# Patient Record
Sex: FEMALE | Race: WHITE | NOT HISPANIC OR LATINO | Employment: OTHER | URBAN - METROPOLITAN AREA
[De-identification: names, ages, dates, MRNs, and addresses within clinical notes are randomized per-mention and may not be internally consistent; named-entity substitution may affect disease eponyms.]

---

## 2019-03-18 ENCOUNTER — HOSPITAL ENCOUNTER (OUTPATIENT)
Dept: GENERAL RADIOLOGY | Facility: HOSPITAL | Age: 74
Discharge: HOME | End: 2019-03-18
Admitting: ORTHOPAEDIC SURGERY

## 2019-03-18 ENCOUNTER — APPOINTMENT (OUTPATIENT)
Dept: PREADMISSION TESTING | Facility: HOSPITAL | Age: 74
End: 2019-03-18

## 2019-03-18 VITALS — HEIGHT: 63 IN | BODY MASS INDEX: 33.83 KG/M2 | WEIGHT: 190.92 LBS

## 2019-03-18 LAB
ALBUMIN SERPL-MCNC: 4.53 G/DL (ref 3.2–4.8)
ALBUMIN/GLOB SERPL: 1.7 G/DL (ref 1.5–2.5)
ALP SERPL-CCNC: 68 U/L (ref 25–100)
ALT SERPL W P-5'-P-CCNC: 21 U/L (ref 7–40)
ANION GAP SERPL CALCULATED.3IONS-SCNC: 9 MMOL/L (ref 3–11)
APTT PPP: 28.6 SECONDS (ref 24–37)
AST SERPL-CCNC: 22 U/L (ref 0–33)
BACTERIA UR QL AUTO: NORMAL /HPF
BASOPHILS # BLD AUTO: 0.04 10*3/MM3 (ref 0–0.2)
BASOPHILS NFR BLD AUTO: 0.4 % (ref 0–1)
BILIRUB SERPL-MCNC: 0.3 MG/DL (ref 0.3–1.2)
BILIRUB UR QL STRIP: NEGATIVE
BUN BLD-MCNC: 32 MG/DL (ref 9–23)
BUN/CREAT SERPL: 38.1 (ref 7–25)
CALCIUM SPEC-SCNC: 9.9 MG/DL (ref 8.7–10.4)
CHLORIDE SERPL-SCNC: 104 MMOL/L (ref 99–109)
CLARITY UR: CLEAR
CO2 SERPL-SCNC: 28 MMOL/L (ref 20–31)
COLOR UR: YELLOW
CREAT BLD-MCNC: 0.84 MG/DL (ref 0.6–1.3)
DEPRECATED RDW RBC AUTO: 47.5 FL (ref 37–54)
EOSINOPHIL # BLD AUTO: 0.17 10*3/MM3 (ref 0–0.3)
EOSINOPHIL NFR BLD AUTO: 1.7 % (ref 0–3)
ERYTHROCYTE [DISTWIDTH] IN BLOOD BY AUTOMATED COUNT: 14.1 % (ref 11.3–14.5)
GFR SERPL CREATININE-BSD FRML MDRD: 66 ML/MIN/1.73
GLOBULIN UR ELPH-MCNC: 2.7 GM/DL
GLUCOSE BLD-MCNC: 110 MG/DL (ref 70–100)
GLUCOSE UR STRIP-MCNC: NEGATIVE MG/DL
HBA1C MFR BLD: 5.9 % (ref 4.8–5.6)
HCT VFR BLD AUTO: 39.8 % (ref 34.5–44)
HGB BLD-MCNC: 13.1 G/DL (ref 11.5–15.5)
HGB UR QL STRIP.AUTO: NEGATIVE
HYALINE CASTS UR QL AUTO: NORMAL /LPF
IMM GRANULOCYTES # BLD AUTO: 0.02 10*3/MM3 (ref 0–0.05)
IMM GRANULOCYTES NFR BLD AUTO: 0.2 % (ref 0–0.6)
INR PPP: 1 (ref 0.85–1.16)
KETONES UR QL STRIP: NEGATIVE
LEUKOCYTE ESTERASE UR QL STRIP.AUTO: NEGATIVE
LYMPHOCYTES # BLD AUTO: 3.42 10*3/MM3 (ref 0.6–4.8)
LYMPHOCYTES NFR BLD AUTO: 33.9 % (ref 24–44)
MCH RBC QN AUTO: 30.6 PG (ref 27–31)
MCHC RBC AUTO-ENTMCNC: 32.9 G/DL (ref 32–36)
MCV RBC AUTO: 93 FL (ref 80–99)
MONOCYTES # BLD AUTO: 0.75 10*3/MM3 (ref 0–1)
MONOCYTES NFR BLD AUTO: 7.4 % (ref 0–12)
NEUTROPHILS # BLD AUTO: 5.71 10*3/MM3 (ref 1.5–8.3)
NEUTROPHILS NFR BLD AUTO: 56.6 % (ref 41–71)
NITRITE UR QL STRIP: NEGATIVE
PH UR STRIP.AUTO: <=5 [PH] (ref 5–8)
PLATELET # BLD AUTO: 350 10*3/MM3 (ref 150–450)
PMV BLD AUTO: 10.9 FL (ref 6–12)
POTASSIUM BLD-SCNC: 3.9 MMOL/L (ref 3.5–5.5)
PROT SERPL-MCNC: 7.2 G/DL (ref 5.7–8.2)
PROT UR QL STRIP: NEGATIVE
PROTHROMBIN TIME: 12.7 SECONDS (ref 11.2–14.3)
RBC # BLD AUTO: 4.28 10*6/MM3 (ref 3.89–5.14)
RBC # UR: NORMAL /HPF
REF LAB TEST METHOD: NORMAL
SODIUM BLD-SCNC: 141 MMOL/L (ref 132–146)
SP GR UR STRIP: 1.02 (ref 1–1.03)
SQUAMOUS #/AREA URNS HPF: NORMAL /HPF
UROBILINOGEN UR QL STRIP: NORMAL
WBC NRBC COR # BLD: 10.09 10*3/MM3 (ref 3.5–10.8)
WBC UR QL AUTO: NORMAL /HPF

## 2019-03-18 PROCEDURE — 36415 COLL VENOUS BLD VENIPUNCTURE: CPT

## 2019-03-18 PROCEDURE — 85610 PROTHROMBIN TIME: CPT | Performed by: ORTHOPAEDIC SURGERY

## 2019-03-18 PROCEDURE — 71046 X-RAY EXAM CHEST 2 VIEWS: CPT

## 2019-03-18 PROCEDURE — 80053 COMPREHEN METABOLIC PANEL: CPT | Performed by: ORTHOPAEDIC SURGERY

## 2019-03-18 PROCEDURE — 93010 ELECTROCARDIOGRAM REPORT: CPT | Performed by: INTERNAL MEDICINE

## 2019-03-18 PROCEDURE — 93005 ELECTROCARDIOGRAM TRACING: CPT

## 2019-03-18 PROCEDURE — 85025 COMPLETE CBC W/AUTO DIFF WBC: CPT | Performed by: ORTHOPAEDIC SURGERY

## 2019-03-18 PROCEDURE — 83036 HEMOGLOBIN GLYCOSYLATED A1C: CPT | Performed by: ORTHOPAEDIC SURGERY

## 2019-03-18 PROCEDURE — 81001 URINALYSIS AUTO W/SCOPE: CPT | Performed by: ORTHOPAEDIC SURGERY

## 2019-03-18 PROCEDURE — 85730 THROMBOPLASTIN TIME PARTIAL: CPT | Performed by: ORTHOPAEDIC SURGERY

## 2019-03-18 RX ORDER — LOSARTAN POTASSIUM 50 MG/1
100 TABLET ORAL DAILY
COMMUNITY

## 2019-03-18 RX ORDER — ASPIRIN 81 MG/1
81 TABLET ORAL DAILY
COMMUNITY

## 2019-03-18 RX ORDER — SERTRALINE HYDROCHLORIDE 100 MG/1
50 TABLET, FILM COATED ORAL DAILY
COMMUNITY

## 2019-03-18 RX ORDER — ATORVASTATIN CALCIUM 10 MG/1
10 TABLET, FILM COATED ORAL NIGHTLY
COMMUNITY

## 2019-03-18 RX ORDER — CHLORTHALIDONE 25 MG/1
25 TABLET ORAL DAILY
COMMUNITY

## 2019-03-18 RX ORDER — CARVEDILOL 12.5 MG/1
12.5 TABLET ORAL 2 TIMES DAILY WITH MEALS
COMMUNITY

## 2019-03-18 RX ORDER — POTASSIUM CHLORIDE 600 MG/1
8 TABLET, FILM COATED, EXTENDED RELEASE ORAL DAILY
COMMUNITY

## 2019-03-18 RX ORDER — ATENOLOL 25 MG/1
25 TABLET ORAL DAILY
COMMUNITY

## 2019-03-18 RX ORDER — PANTOPRAZOLE SODIUM 40 MG/1
40 TABLET, DELAYED RELEASE ORAL DAILY
COMMUNITY

## 2019-03-18 RX ORDER — LEVOTHYROXINE SODIUM 0.05 MG/1
50 TABLET ORAL DAILY
COMMUNITY

## 2019-03-18 NOTE — PAT
CARDIAC CLEARANCE NOTE ON CHART FROM DR. GRAVES.    It was noted during Pre Admission Testing that patient was wearing some form of fingernail polish (gel/regular) and/or acrylic/artificial nails.  Patient was told that polish and/or artificial nails must be removed for surgery.  If a patient had recent manicure, and would rather not remove polish or artificial nails. Then the minimum requirement is that the polish/artificial nails must be removed from the middle finger on each hand.  Patient verbalized understanding.    If patient was having surgery on an upper extremity, then the patient was instructed that fingernail polish/artificial fingernails must be removed for surgery.  NO EXCEPTIONS.  Patient verbalized understanding.    If patient was having surgery on a lower extremity, then the patient was instructed that toenail polish on both extremities must be removed for surgery.  NO EXCEPTIONS. Patient verbalized understanding.      Per Anesthesia Request, patient instructed not to take their ACE/ARB medications on the AM of surgery.    Patient instructed to drink 20 ounces (or until full) of Gatorade or 20 ounces of G2 (if diabetic) and complete 1 hour before arrival time for procedure (NO RED Gatorade or G2)    Patient verbalized understanding.      Patient given a prescription for Benzol Peroxide 5% wash during PAT visit.  Instructed them to fill the prescription or  from [facility] pharmacy if was submitted electronically by the surgeon's office.  Verbal and written instructions given regarding proper use of the Benzoyl Peroxide wash given to patient and/or famlily during PAT visit. Patient/family also instructed to complete checklist and return it to Pre-op on the day of surgery.  Patient and/or family verbalized understanding.      Additionally, reinforced with patient to acquire this prescription from the [facility] retail pharmacy before leaving the hospital after PAT visit due to the potential unavailability at  local pharmacies.

## 2019-03-18 NOTE — DISCHARGE INSTRUCTIONS
The following information and instructions were given:    Do not eat, drink, smoke or chew gum after midnight the night before surgery. This also includes no mints.  Take all routine, prescribed medications including heart and blood pressure medicines with a sip of water unless otherwise instructed by your physician.   Do NOT take diabetic medication unless instructed by your physician.    If you were instructed to drink 20 ounces (or until full) of Gatorade or G2 (if diabetic) the morning of surgery, the Gatorade or G2 must be completed 1 hour before arrival time on the day of surgery .  No RED Gatorade or G2.      DO NOT shave for two days before your procedure.  Do not wear makeup.      DO NOT wear fingernail polish (gel/regular) and/or acrylic/artificial nails on the day of surgery.   If you have had a recent manicure and would rather not remove polish or artificial nails, then the minimum requirement is that the polish/artificial nails must be removed from the middle finger on each hand.      If you are having surgery/procedure on an upper extremity, then fingernail polish/artificial fingernails must be removed for surgery.  NO EXCEPTIONS.      If you are having surgery/procedure on a lower extremity, then toenail polish on both extremities must be removed for surgery.  NO EXCEPTIONS.    Remove all jewelry (advise to go to jeweler if unable to remove).  Jewelry, especially rings, can no longer be taped for surgery.    Leave anything you consider valuable at home.    Leave your suitcase in the car until after your surgery.    Bring the following with you the day of your procedure (when applicable)       -picture ID and insurance cards       -Co-pay/deductible required by insurance       -Medications in the original bottles (not a list) including all over-the-counter medications if not brought to PAT       -Copy of advance directive, living will or power of  documents if not brought to PAT       -CPAP or  BIPAP mask and tubing (do not bring machine)       -Skin prep instruction(s) sheet       -PAT Pass    Education booklet, brochure, handout or binder related to procedure given to patient.    When applicable, an ERAS booklet was given to patient.    Pain Control After Surgery handout given to patient.    Respirex use (handout given to patient) and pneumonia prevention education provided.    Signs and Symptoms of infection discussed.    DVT Prevention education given.  Stressing the importance of ambulation.    Please apply Chlorhexadine wipes to surgical area (if instructed) the night before procedure and the AM of procedure and document date/time of applications on skin prep instruction sheet.        Patient given a prescription for Benzol Peroxide 5% wash during PAT visit.  Instructed them to fill the prescription or  from [facility] pharmacy if was submitted electronically by the surgeon's office.  Verbal and written instructions given regarding proper use of the Benzoyl Peroxide wash given to patient and/or famlily during PAT visit. Patient/family also instructed to complete checklist and return it to Pre-op on the day of surgery.  Patient and/or family verbalized understanding.      Additionally, reinforced with patient to acquire this prescription from the [facility] retail pharmacy before leaving the hospital after PAT visit due to the potential unavailability at local pharmacies.    Patient instructed to drink 20 ounces (or until full) of Gatorade or 20 ounces of G2 (if diabetic) and complete 1 hour before arrival time for procedure (NO RED Gatorade or G2)    Patient verbalized understanding.

## 2019-03-28 ENCOUNTER — APPOINTMENT (OUTPATIENT)
Dept: GENERAL RADIOLOGY | Facility: HOSPITAL | Age: 74
End: 2019-03-28

## 2019-03-28 ENCOUNTER — HOSPITAL ENCOUNTER (INPATIENT)
Facility: HOSPITAL | Age: 74
LOS: 4 days | Discharge: NURSING, REHAB OR LONG-TERM CARE | End: 2019-04-01
Attending: ORTHOPAEDIC SURGERY | Admitting: ORTHOPAEDIC SURGERY

## 2019-03-28 ENCOUNTER — ANESTHESIA EVENT (OUTPATIENT)
Dept: PERIOP | Facility: HOSPITAL | Age: 74
End: 2019-03-28

## 2019-03-28 ENCOUNTER — ANESTHESIA (OUTPATIENT)
Dept: PERIOP | Facility: HOSPITAL | Age: 74
End: 2019-03-28

## 2019-03-28 DIAGNOSIS — Z74.09 IMPAIRED FUNCTIONAL MOBILITY, BALANCE, GAIT, AND ENDURANCE: Primary | ICD-10-CM

## 2019-03-28 DIAGNOSIS — Z74.09 IMPAIRED MOBILITY AND ADLS: ICD-10-CM

## 2019-03-28 DIAGNOSIS — Z78.9 IMPAIRED MOBILITY AND ADLS: ICD-10-CM

## 2019-03-28 PROBLEM — R73.03 PREDIABETES: Status: ACTIVE | Noted: 2019-03-28

## 2019-03-28 PROBLEM — E78.5 HLD (HYPERLIPIDEMIA): Status: ACTIVE | Noted: 2019-03-28

## 2019-03-28 PROBLEM — M19.011 GLENOHUMERAL ARTHRITIS, RIGHT: Status: ACTIVE | Noted: 2019-03-28

## 2019-03-28 PROBLEM — M25.511 RIGHT SHOULDER PAIN: Status: ACTIVE | Noted: 2019-03-28

## 2019-03-28 PROBLEM — I10 HTN (HYPERTENSION): Status: ACTIVE | Noted: 2019-03-28

## 2019-03-28 PROBLEM — E03.9 HYPOTHYROID: Status: ACTIVE | Noted: 2019-03-28

## 2019-03-28 LAB
GLUCOSE BLDC GLUCOMTR-MCNC: 126 MG/DL (ref 70–130)
GLUCOSE BLDC GLUCOMTR-MCNC: 219 MG/DL (ref 70–130)
POTASSIUM BLDA-SCNC: 3.94 MMOL/L (ref 3.5–5.3)

## 2019-03-28 PROCEDURE — 25010000002 DEXAMETHASONE PER 1 MG: Performed by: NURSE ANESTHETIST, CERTIFIED REGISTERED

## 2019-03-28 PROCEDURE — 84132 ASSAY OF SERUM POTASSIUM: CPT | Performed by: ANESTHESIOLOGY

## 2019-03-28 PROCEDURE — 25010000003 CEFAZOLIN IN DEXTROSE 2-4 GM/100ML-% SOLUTION: Performed by: ORTHOPAEDIC SURGERY

## 2019-03-28 PROCEDURE — 0RRJ00Z REPLACEMENT OF RIGHT SHOULDER JOINT WITH REVERSE BALL AND SOCKET SYNTHETIC SUBSTITUTE, OPEN APPROACH: ICD-10-PCS | Performed by: ORTHOPAEDIC SURGERY

## 2019-03-28 PROCEDURE — L3670 SO ACRO/CLAV CAN WEB PRE OTS: HCPCS | Performed by: ORTHOPAEDIC SURGERY

## 2019-03-28 PROCEDURE — 25010000002 VANCOMYCIN 1 G RECONSTITUTED SOLUTION: Performed by: ORTHOPAEDIC SURGERY

## 2019-03-28 PROCEDURE — C1713 ANCHOR/SCREW BN/BN,TIS/BN: HCPCS | Performed by: ORTHOPAEDIC SURGERY

## 2019-03-28 PROCEDURE — C1776 JOINT DEVICE (IMPLANTABLE): HCPCS | Performed by: ORTHOPAEDIC SURGERY

## 2019-03-28 PROCEDURE — 25010000002 ONDANSETRON PER 1 MG: Performed by: NURSE PRACTITIONER

## 2019-03-28 PROCEDURE — 63710000001 INSULIN LISPRO (HUMAN) PER 5 UNITS: Performed by: NURSE PRACTITIONER

## 2019-03-28 PROCEDURE — 25010000002 PROPOFOL 10 MG/ML EMULSION: Performed by: NURSE ANESTHETIST, CERTIFIED REGISTERED

## 2019-03-28 PROCEDURE — 82962 GLUCOSE BLOOD TEST: CPT

## 2019-03-28 PROCEDURE — 25010000002 FENTANYL CITRATE (PF) 100 MCG/2ML SOLUTION: Performed by: NURSE ANESTHETIST, CERTIFIED REGISTERED

## 2019-03-28 PROCEDURE — 25010000002 KETOROLAC TROMETHAMINE PER 15 MG: Performed by: ORTHOPAEDIC SURGERY

## 2019-03-28 PROCEDURE — 25010000002 ROPIVACAINE PER 1 MG: Performed by: NURSE ANESTHETIST, CERTIFIED REGISTERED

## 2019-03-28 PROCEDURE — 73020 X-RAY EXAM OF SHOULDER: CPT

## 2019-03-28 PROCEDURE — 25010000002 ONDANSETRON PER 1 MG: Performed by: NURSE ANESTHETIST, CERTIFIED REGISTERED

## 2019-03-28 DEVICE — IMPLANTABLE DEVICE: Type: IMPLANTABLE DEVICE | Site: SHOULDER | Status: FUNCTIONAL

## 2019-03-28 DEVICE — BASEPLT GLEN UNIVERS REVERS MODULAR LAT 24MM PLS2: Type: IMPLANTABLE DEVICE | Site: SHOULDER | Status: FUNCTIONAL

## 2019-03-28 DEVICE — SCRW CENTRL GLEN UNIVERS REVERS 20MM: Type: IMPLANTABLE DEVICE | Site: SHOULDER | Status: FUNCTIONAL

## 2019-03-28 DEVICE — CUP SUT UNIVERS REVERS 36 NTRL: Type: IMPLANTABLE DEVICE | Site: SHOULDER | Status: FUNCTIONAL

## 2019-03-28 DEVICE — SCRW NL GLEN UNIVERS REVERS PERIPH 4.5X36MM: Type: IMPLANTABLE DEVICE | Site: SHOULDER | Status: FUNCTIONAL

## 2019-03-28 DEVICE — SCRW NL GLEN UNIVERS REVERS PERIPH 4.5X28MM: Type: IMPLANTABLE DEVICE | Site: SHOULDER | Status: FUNCTIONAL

## 2019-03-28 DEVICE — GLENOSPHERE UNIVERS REVERS MODULAR L/24 36PLS4: Type: IMPLANTABLE DEVICE | Site: SHOULDER | Status: FUNCTIONAL

## 2019-03-28 DEVICE — SCRW LK GLEN UNIVERS REVERS PERIPH 5.5X20MM: Type: IMPLANTABLE DEVICE | Site: SHOULDER | Status: FUNCTIONAL

## 2019-03-28 DEVICE — SCRW LK GLEN UNIVERS REVERS PERIPH 5.5X36MM: Type: IMPLANTABLE DEVICE | Site: SHOULDER | Status: FUNCTIONAL

## 2019-03-28 RX ORDER — DEXAMETHASONE SODIUM PHOSPHATE 4 MG/ML
INJECTION, SOLUTION INTRA-ARTICULAR; INTRALESIONAL; INTRAMUSCULAR; INTRAVENOUS; SOFT TISSUE AS NEEDED
Status: DISCONTINUED | OUTPATIENT
Start: 2019-03-28 | End: 2019-03-28 | Stop reason: SURG

## 2019-03-28 RX ORDER — ONDANSETRON 4 MG/1
4 TABLET, FILM COATED ORAL EVERY 6 HOURS PRN
Status: DISCONTINUED | OUTPATIENT
Start: 2019-03-28 | End: 2019-04-01 | Stop reason: HOSPADM

## 2019-03-28 RX ORDER — ATENOLOL 50 MG/1
25 TABLET ORAL DAILY
Status: DISCONTINUED | OUTPATIENT
Start: 2019-03-29 | End: 2019-04-01 | Stop reason: HOSPADM

## 2019-03-28 RX ORDER — SODIUM CHLORIDE, SODIUM LACTATE, POTASSIUM CHLORIDE, CALCIUM CHLORIDE 600; 310; 30; 20 MG/100ML; MG/100ML; MG/100ML; MG/100ML
9 INJECTION, SOLUTION INTRAVENOUS CONTINUOUS
Status: DISCONTINUED | OUTPATIENT
Start: 2019-03-28 | End: 2019-03-28

## 2019-03-28 RX ORDER — BUPIVACAINE HYDROCHLORIDE 2.5 MG/ML
INJECTION, SOLUTION EPIDURAL; INFILTRATION; INTRACAUDAL
Status: COMPLETED | OUTPATIENT
Start: 2019-03-28 | End: 2019-03-28

## 2019-03-28 RX ORDER — ACETAMINOPHEN 160 MG/5ML
650 SOLUTION ORAL EVERY 4 HOURS PRN
Status: DISCONTINUED | OUTPATIENT
Start: 2019-03-28 | End: 2019-04-01 | Stop reason: HOSPADM

## 2019-03-28 RX ORDER — ATRACURIUM BESYLATE 10 MG/ML
INJECTION, SOLUTION INTRAVENOUS AS NEEDED
Status: DISCONTINUED | OUTPATIENT
Start: 2019-03-28 | End: 2019-03-28 | Stop reason: SURG

## 2019-03-28 RX ORDER — BISACODYL 5 MG/1
10 TABLET, DELAYED RELEASE ORAL DAILY PRN
Status: DISCONTINUED | OUTPATIENT
Start: 2019-03-28 | End: 2019-04-01 | Stop reason: HOSPADM

## 2019-03-28 RX ORDER — ACETAMINOPHEN 325 MG/1
650 TABLET ORAL EVERY 4 HOURS PRN
Status: DISCONTINUED | OUTPATIENT
Start: 2019-03-28 | End: 2019-04-01 | Stop reason: HOSPADM

## 2019-03-28 RX ORDER — ZOLPIDEM TARTRATE 5 MG/1
10 TABLET ORAL NIGHTLY
Status: DISCONTINUED | OUTPATIENT
Start: 2019-03-28 | End: 2019-04-01 | Stop reason: HOSPADM

## 2019-03-28 RX ORDER — OXYCODONE HYDROCHLORIDE 5 MG/1
10 TABLET ORAL EVERY 4 HOURS PRN
Status: DISCONTINUED | OUTPATIENT
Start: 2019-03-28 | End: 2019-04-01 | Stop reason: HOSPADM

## 2019-03-28 RX ORDER — SIMETHICONE 80 MG
80 TABLET,CHEWABLE ORAL 4 TIMES DAILY PRN
Status: DISCONTINUED | OUTPATIENT
Start: 2019-03-28 | End: 2019-04-01 | Stop reason: HOSPADM

## 2019-03-28 RX ORDER — VANCOMYCIN HYDROCHLORIDE 1 G/20ML
INJECTION, POWDER, LYOPHILIZED, FOR SOLUTION INTRAVENOUS AS NEEDED
Status: DISCONTINUED | OUTPATIENT
Start: 2019-03-28 | End: 2019-03-28 | Stop reason: HOSPADM

## 2019-03-28 RX ORDER — DOCUSATE SODIUM 100 MG/1
100 CAPSULE, LIQUID FILLED ORAL 2 TIMES DAILY PRN
Status: DISCONTINUED | OUTPATIENT
Start: 2019-03-28 | End: 2019-04-01 | Stop reason: HOSPADM

## 2019-03-28 RX ORDER — PANTOPRAZOLE SODIUM 40 MG/1
40 TABLET, DELAYED RELEASE ORAL DAILY
Status: DISCONTINUED | OUTPATIENT
Start: 2019-03-29 | End: 2019-04-01 | Stop reason: HOSPADM

## 2019-03-28 RX ORDER — FENTANYL CITRATE 50 UG/ML
50 INJECTION, SOLUTION INTRAMUSCULAR; INTRAVENOUS
Status: DISCONTINUED | OUTPATIENT
Start: 2019-03-28 | End: 2019-03-28 | Stop reason: HOSPADM

## 2019-03-28 RX ORDER — FAMOTIDINE 10 MG/ML
20 INJECTION, SOLUTION INTRAVENOUS ONCE
Status: CANCELLED | OUTPATIENT
Start: 2019-03-28 | End: 2019-03-28

## 2019-03-28 RX ORDER — ONDANSETRON 2 MG/ML
4 INJECTION INTRAMUSCULAR; INTRAVENOUS EVERY 6 HOURS PRN
Status: DISCONTINUED | OUTPATIENT
Start: 2019-03-28 | End: 2019-04-01 | Stop reason: HOSPADM

## 2019-03-28 RX ORDER — LOSARTAN POTASSIUM 25 MG/1
100 TABLET ORAL DAILY
Status: DISCONTINUED | OUTPATIENT
Start: 2019-03-28 | End: 2019-04-01 | Stop reason: HOSPADM

## 2019-03-28 RX ORDER — HYDROMORPHONE HYDROCHLORIDE 1 MG/ML
0.5 INJECTION, SOLUTION INTRAMUSCULAR; INTRAVENOUS; SUBCUTANEOUS
Status: DISCONTINUED | OUTPATIENT
Start: 2019-03-28 | End: 2019-03-28 | Stop reason: HOSPADM

## 2019-03-28 RX ORDER — ATORVASTATIN CALCIUM 10 MG/1
10 TABLET, FILM COATED ORAL NIGHTLY
Status: DISCONTINUED | OUTPATIENT
Start: 2019-03-28 | End: 2019-04-01 | Stop reason: HOSPADM

## 2019-03-28 RX ORDER — LABETALOL HYDROCHLORIDE 5 MG/ML
10 INJECTION, SOLUTION INTRAVENOUS EVERY 4 HOURS PRN
Status: DISCONTINUED | OUTPATIENT
Start: 2019-03-28 | End: 2019-04-01 | Stop reason: HOSPADM

## 2019-03-28 RX ORDER — SERTRALINE HYDROCHLORIDE 100 MG/1
100 TABLET, FILM COATED ORAL DAILY
Status: DISCONTINUED | OUTPATIENT
Start: 2019-03-29 | End: 2019-04-01 | Stop reason: HOSPADM

## 2019-03-28 RX ORDER — KETOROLAC TROMETHAMINE 30 MG/ML
15 INJECTION, SOLUTION INTRAMUSCULAR; INTRAVENOUS EVERY 6 HOURS PRN
Status: COMPLETED | OUTPATIENT
Start: 2019-03-28 | End: 2019-03-30

## 2019-03-28 RX ORDER — PROPOFOL 10 MG/ML
VIAL (ML) INTRAVENOUS AS NEEDED
Status: DISCONTINUED | OUTPATIENT
Start: 2019-03-28 | End: 2019-03-28 | Stop reason: SURG

## 2019-03-28 RX ORDER — OXYCODONE HYDROCHLORIDE 5 MG/1
5 TABLET ORAL EVERY 4 HOURS PRN
Status: DISCONTINUED | OUTPATIENT
Start: 2019-03-28 | End: 2019-04-01 | Stop reason: HOSPADM

## 2019-03-28 RX ORDER — ASPIRIN 81 MG/1
81 TABLET ORAL DAILY
Status: DISCONTINUED | OUTPATIENT
Start: 2019-03-29 | End: 2019-04-01 | Stop reason: HOSPADM

## 2019-03-28 RX ORDER — MAGNESIUM HYDROXIDE 1200 MG/15ML
LIQUID ORAL AS NEEDED
Status: DISCONTINUED | OUTPATIENT
Start: 2019-03-28 | End: 2019-03-28 | Stop reason: HOSPADM

## 2019-03-28 RX ORDER — CARVEDILOL 12.5 MG/1
12.5 TABLET ORAL EVERY 12 HOURS SCHEDULED
Status: DISCONTINUED | OUTPATIENT
Start: 2019-03-28 | End: 2019-04-01 | Stop reason: HOSPADM

## 2019-03-28 RX ORDER — SODIUM CHLORIDE 450 MG/100ML
50 INJECTION, SOLUTION INTRAVENOUS CONTINUOUS
Status: DISCONTINUED | OUTPATIENT
Start: 2019-03-28 | End: 2019-04-01 | Stop reason: HOSPADM

## 2019-03-28 RX ORDER — NICOTINE POLACRILEX 4 MG
15 LOZENGE BUCCAL
Status: DISCONTINUED | OUTPATIENT
Start: 2019-03-28 | End: 2019-04-01 | Stop reason: HOSPADM

## 2019-03-28 RX ORDER — NALOXONE HCL 0.4 MG/ML
0.1 VIAL (ML) INJECTION
Status: DISCONTINUED | OUTPATIENT
Start: 2019-03-28 | End: 2019-04-01 | Stop reason: HOSPADM

## 2019-03-28 RX ORDER — ONDANSETRON 2 MG/ML
INJECTION INTRAMUSCULAR; INTRAVENOUS AS NEEDED
Status: DISCONTINUED | OUTPATIENT
Start: 2019-03-28 | End: 2019-03-28 | Stop reason: SURG

## 2019-03-28 RX ORDER — CEFAZOLIN SODIUM 2 G/100ML
2 INJECTION, SOLUTION INTRAVENOUS EVERY 8 HOURS
Status: COMPLETED | OUTPATIENT
Start: 2019-03-28 | End: 2019-03-29

## 2019-03-28 RX ORDER — LIDOCAINE HYDROCHLORIDE 10 MG/ML
INJECTION, SOLUTION INFILTRATION; PERINEURAL AS NEEDED
Status: DISCONTINUED | OUTPATIENT
Start: 2019-03-28 | End: 2019-03-28 | Stop reason: SURG

## 2019-03-28 RX ORDER — CHLORTHALIDONE 25 MG/1
25 TABLET ORAL DAILY
Status: DISCONTINUED | OUTPATIENT
Start: 2019-03-28 | End: 2019-03-29

## 2019-03-28 RX ORDER — SODIUM CHLORIDE 0.9 % (FLUSH) 0.9 %
3 SYRINGE (ML) INJECTION EVERY 12 HOURS SCHEDULED
Status: CANCELLED | OUTPATIENT
Start: 2019-03-28

## 2019-03-28 RX ORDER — POTASSIUM CHLORIDE 750 MG/1
10 CAPSULE, EXTENDED RELEASE ORAL DAILY
Status: DISCONTINUED | OUTPATIENT
Start: 2019-03-29 | End: 2019-04-01 | Stop reason: HOSPADM

## 2019-03-28 RX ORDER — LIDOCAINE HYDROCHLORIDE 10 MG/ML
0.5 INJECTION, SOLUTION EPIDURAL; INFILTRATION; INTRACAUDAL; PERINEURAL ONCE AS NEEDED
Status: COMPLETED | OUTPATIENT
Start: 2019-03-28 | End: 2019-03-28

## 2019-03-28 RX ORDER — DEXTROSE MONOHYDRATE 25 G/50ML
25 INJECTION, SOLUTION INTRAVENOUS
Status: DISCONTINUED | OUTPATIENT
Start: 2019-03-28 | End: 2019-04-01 | Stop reason: HOSPADM

## 2019-03-28 RX ORDER — SODIUM CHLORIDE 0.9 % (FLUSH) 0.9 %
3-10 SYRINGE (ML) INJECTION AS NEEDED
Status: CANCELLED | OUTPATIENT
Start: 2019-03-28

## 2019-03-28 RX ORDER — HYDROMORPHONE HYDROCHLORIDE 1 MG/ML
0.5 INJECTION, SOLUTION INTRAMUSCULAR; INTRAVENOUS; SUBCUTANEOUS
Status: DISCONTINUED | OUTPATIENT
Start: 2019-03-28 | End: 2019-04-01 | Stop reason: HOSPADM

## 2019-03-28 RX ORDER — ACETAMINOPHEN 650 MG/1
650 SUPPOSITORY RECTAL EVERY 4 HOURS PRN
Status: DISCONTINUED | OUTPATIENT
Start: 2019-03-28 | End: 2019-04-01 | Stop reason: HOSPADM

## 2019-03-28 RX ORDER — FAMOTIDINE 20 MG/1
20 TABLET, FILM COATED ORAL ONCE
Status: COMPLETED | OUTPATIENT
Start: 2019-03-28 | End: 2019-03-28

## 2019-03-28 RX ORDER — POVIDONE-IODINE 10 MG/G
OINTMENT TOPICAL AS NEEDED
Status: DISCONTINUED | OUTPATIENT
Start: 2019-03-28 | End: 2019-03-28 | Stop reason: HOSPADM

## 2019-03-28 RX ORDER — CEFAZOLIN SODIUM 2 G/100ML
2 INJECTION, SOLUTION INTRAVENOUS ONCE
Status: COMPLETED | OUTPATIENT
Start: 2019-03-28 | End: 2019-03-28

## 2019-03-28 RX ORDER — LEVOTHYROXINE SODIUM 0.05 MG/1
50 TABLET ORAL
Status: DISCONTINUED | OUTPATIENT
Start: 2019-03-29 | End: 2019-04-01 | Stop reason: HOSPADM

## 2019-03-28 RX ADMIN — DEXAMETHASONE SODIUM PHOSPHATE 8 MG: 4 INJECTION, SOLUTION INTRAMUSCULAR; INTRAVENOUS at 12:39

## 2019-03-28 RX ADMIN — FAMOTIDINE 20 MG: 20 TABLET ORAL at 11:11

## 2019-03-28 RX ADMIN — EPHEDRINE SULFATE 10 MG: 50 INJECTION INTRAMUSCULAR; INTRAVENOUS; SUBCUTANEOUS at 12:47

## 2019-03-28 RX ADMIN — EPHEDRINE SULFATE 10 MG: 50 INJECTION INTRAMUSCULAR; INTRAVENOUS; SUBCUTANEOUS at 12:45

## 2019-03-28 RX ADMIN — TRANEXAMIC ACID 1000 MG: 100 INJECTION, SOLUTION INTRAVENOUS at 13:53

## 2019-03-28 RX ADMIN — SODIUM CHLORIDE 50 ML/HR: 4.5 INJECTION, SOLUTION INTRAVENOUS at 16:23

## 2019-03-28 RX ADMIN — KETOROLAC TROMETHAMINE 15 MG: 30 INJECTION, SOLUTION INTRAMUSCULAR; INTRAVENOUS at 19:03

## 2019-03-28 RX ADMIN — EPHEDRINE SULFATE 10 MG: 50 INJECTION INTRAMUSCULAR; INTRAVENOUS; SUBCUTANEOUS at 13:22

## 2019-03-28 RX ADMIN — CEFAZOLIN SODIUM 2 G: 2 INJECTION, SOLUTION INTRAVENOUS at 20:05

## 2019-03-28 RX ADMIN — SODIUM CHLORIDE, POTASSIUM CHLORIDE, SODIUM LACTATE AND CALCIUM CHLORIDE: 600; 310; 30; 20 INJECTION, SOLUTION INTRAVENOUS at 14:01

## 2019-03-28 RX ADMIN — FENTANYL CITRATE 50 MCG: 50 INJECTION INTRAMUSCULAR; INTRAVENOUS at 15:00

## 2019-03-28 RX ADMIN — ATRACURIUM BESYLATE 30 MG: 10 INJECTION, SOLUTION INTRAVENOUS at 12:31

## 2019-03-28 RX ADMIN — FENTANYL CITRATE 50 MCG: 50 INJECTION INTRAMUSCULAR; INTRAVENOUS at 14:45

## 2019-03-28 RX ADMIN — ZOLPIDEM TARTRATE 10 MG: 5 TABLET ORAL at 21:06

## 2019-03-28 RX ADMIN — SODIUM CHLORIDE, POTASSIUM CHLORIDE, SODIUM LACTATE AND CALCIUM CHLORIDE 9 ML/HR: 600; 310; 30; 20 INJECTION, SOLUTION INTRAVENOUS at 10:50

## 2019-03-28 RX ADMIN — LOSARTAN POTASSIUM 100 MG: 25 TABLET, FILM COATED ORAL at 18:30

## 2019-03-28 RX ADMIN — FENTANYL CITRATE 50 MCG: 50 INJECTION INTRAMUSCULAR; INTRAVENOUS at 13:35

## 2019-03-28 RX ADMIN — PROPOFOL 150 MG: 10 INJECTION, EMULSION INTRAVENOUS at 12:31

## 2019-03-28 RX ADMIN — LIDOCAINE HYDROCHLORIDE 0.2 ML: 10 INJECTION, SOLUTION EPIDURAL; INFILTRATION; INTRACAUDAL; PERINEURAL at 10:50

## 2019-03-28 RX ADMIN — OXYCODONE HYDROCHLORIDE 5 MG: 5 TABLET ORAL at 21:06

## 2019-03-28 RX ADMIN — EPHEDRINE SULFATE 10 MG: 50 INJECTION INTRAMUSCULAR; INTRAVENOUS; SUBCUTANEOUS at 13:21

## 2019-03-28 RX ADMIN — LIDOCAINE HYDROCHLORIDE 50 MG: 10 INJECTION, SOLUTION INFILTRATION; PERINEURAL at 12:31

## 2019-03-28 RX ADMIN — ONDANSETRON 4 MG: 2 INJECTION INTRAMUSCULAR; INTRAVENOUS at 16:21

## 2019-03-28 RX ADMIN — TRANEXAMIC ACID 1000 MG: 100 INJECTION, SOLUTION INTRAVENOUS at 12:39

## 2019-03-28 RX ADMIN — ONDANSETRON 4 MG: 2 INJECTION INTRAMUSCULAR; INTRAVENOUS at 13:55

## 2019-03-28 RX ADMIN — EPHEDRINE SULFATE 10 MG: 50 INJECTION INTRAMUSCULAR; INTRAVENOUS; SUBCUTANEOUS at 12:48

## 2019-03-28 RX ADMIN — CEFAZOLIN SODIUM 2 G: 2 INJECTION, SOLUTION INTRAVENOUS at 12:27

## 2019-03-28 RX ADMIN — FENTANYL CITRATE 50 MCG: 50 INJECTION INTRAMUSCULAR; INTRAVENOUS at 15:10

## 2019-03-28 RX ADMIN — SIMETHICONE 80 MG: 80 TABLET, CHEWABLE ORAL at 20:23

## 2019-03-28 RX ADMIN — BUPIVACAINE HYDROCHLORIDE 15 ML: 2.5 INJECTION, SOLUTION EPIDURAL; INFILTRATION; INTRACAUDAL; PERINEURAL at 11:32

## 2019-03-28 RX ADMIN — ROPIVACAINE HYDROCHLORIDE 6 ML/HR: 5 INJECTION, SOLUTION EPIDURAL; INFILTRATION; PERINEURAL at 14:39

## 2019-03-28 NOTE — ANESTHESIA POSTPROCEDURE EVALUATION
Patient: Milagros Ortiz    Procedure Summary     Date:  03/28/19 Room / Location:   [facility]    Anesthesia Start:  1227 Anesthesia Stop:  1435    Procedure:  RIGHT TOTAL SHOULDER REVERSE ARTHROPLASTY WITH BICEPS TENOTOMY (Right Shoulder) Diagnosis:       Glenohumeral arthritis, right      (glenohumeral arthritis)    Surgeon:  Francisco Sims MD Provider:  Rajesh Stern MD    Anesthesia Type:  general ASA Status:  3          Anesthesia Type: general  Last vitals  BP   134/68   Temp   97   Pulse   60   Resp   18   SpO2   96     Post Anesthesia Care and Evaluation    Patient location during evaluation: PACU  Patient participation: complete - patient participated  Level of consciousness: awake and alert  Pain score: 0  Pain management: adequate  Airway patency: patent  Anesthetic complications: No anesthetic complications  PONV Status: none  Cardiovascular status: hemodynamically stable and acceptable  Respiratory status: nonlabored ventilation, acceptable and nasal cannula  Hydration status: acceptable

## 2019-03-28 NOTE — ANESTHESIA PROCEDURE NOTES
Peripheral Block      Patient location during procedure: pre-op  Reason for block: at surgeon's request and post-op pain management  Performed by  CRNA: Arianna Rollins CRNA  Assisted by: Rakesh Starr MD  Preanesthetic Checklist  Completed: patient identified, site marked, surgical consent, pre-op evaluation, timeout performed, IV checked, risks and benefits discussed and monitors and equipment checked  Prep:  Sterile barriers:cap, gloves, mask and sterile barriers  Prep: ChloraPrep  Patient monitoring: blood pressure monitoring, continuous pulse oximetry and EKG  Procedure  Sedation:yes    Guidance:ultrasound guided  Images:still images obtained    Block Type:interscalene  Injection Technique:catheter  Needle Type:Tuohy and echogenic  Needle Gauge:18 G    Catheter Size:20 G (20g)  Cath Depth at skin: 8 cm  Medications Used: bupivacaine PF (MARCAINE) 0.25 % injection, 15 mL  Med admintered at 3/28/2019 11:32 AM  Post Assessment  Injection Assessment: negative aspiration for heme, no paresthesia on injection and incremental injection  Patient Tolerance:comfortable throughout block  Complications:no  Additional Notes  Procedure:                 The pt was placed in semifowlers position with a slight tilt of the thorax contralateral to the insertion site.  The Insertion Site was prepped and draped in sterile fashion.  The pt was anesthetized with  IV Sedation( see meds) and  Skin and cutaneous tissue was infiltrated and anesthetized with 1% Lidocaine 3 mls via a 25g needle.  Utilizing ultrasound guidance, a BBraun 2 inch 18 g Contiplex echogenic touhy needle was advanced in-plane.  Hydro dissection of tissue was achieved with Normal saline. Major vessels(carotid and Internal Jugular) where visualized as the brachial plexus was approached at the approximate level of C-7/ T-1.  Cervical 5 and Branches of Cervical 6 nerve roots where visualized and the needle tip was placed posterior at the level of C-6 roots.   LA spread was visualized and injection was made incrementally every 5 mls with aspiration. Injection pressure was normal or little, there was no intraneural injection, no vascular injection.      The BBraun 20 g wire stylet  catheter was then placed under US guidance on the posterior aspect of the Brachial Plexus.  Location of catheter was confirmed with NS injection visualized with US . The tuohy was then removed and the skin was sealed with Skin AFix at catheter insertion site.  Skin was prepped with mastisol and the labeled catheter  was secured with steristrips and a CHG tegaderm. Thank You.

## 2019-03-28 NOTE — ANESTHESIA PROCEDURE NOTES
Airway  Urgency: elective    Airway not difficult    General Information and Staff    Patient location during procedure: OR  CRNA: Arianna Rollins CRNA    Indications and Patient Condition  Indications for airway management: airway protection    Preoxygenated: yes  MILS not maintained throughout  Mask difficulty assessment: 1 - vent by mask    Final Airway Details  Final airway type: endotracheal airway      Successful airway: ETT  Cuffed: yes   Successful intubation technique: direct laryngoscopy  Endotracheal tube insertion site: oral  Blade: Dangelo  Blade size: 3  ETT size (mm): 7.0  Cormack-Lehane Classification: grade I - full view of glottis  Placement verified by: chest auscultation and capnometry   Cuff volume (mL): 5  Measured from: lips  ETT to lips (cm): 20  Number of attempts at approach: 1    Additional Comments  Negative epigastric sounds, Breath sound equal bilaterally with symmetric chest rise and fall

## 2019-03-28 NOTE — ANESTHESIA PREPROCEDURE EVALUATION
Anesthesia Evaluation     Patient summary reviewed and Nursing notes reviewed                Airway   Mallampati: II  TM distance: >3 FB  Neck ROM: full  No difficulty expected  Dental - normal exam     Comment: Caps all across upper front    Pulmonary - negative pulmonary ROS and normal exam   Cardiovascular - normal exam    (+) hypertension, CAD, cardiac stents (ASA today) more than 12 months ago hyperlipidemia,       Neuro/Psych- negative ROS  GI/Hepatic/Renal/Endo    (+)  GERD,  hypothyroidism,     Musculoskeletal     Abdominal  - normal exam    Bowel sounds: normal.   Substance History - negative use     OB/GYN negative ob/gyn ROS         Other   (+) arthritis                   Anesthesia Plan    ASA 3     general   (Peripheral nerve block + cath for post op pain relief)  intravenous induction   Anesthetic plan, all risks, benefits, and alternatives have been provided, discussed and informed consent has been obtained with: patient.    Plan discussed with CRNA.

## 2019-03-29 PROBLEM — D72.829 LEUKOCYTOSIS: Status: ACTIVE | Noted: 2019-03-29

## 2019-03-29 PROBLEM — G89.18 ACUTE POSTOPERATIVE PAIN: Status: ACTIVE | Noted: 2019-03-29

## 2019-03-29 PROBLEM — Z96.611 S/P REVERSE TOTAL SHOULDER ARTHROPLASTY, RIGHT: Status: ACTIVE | Noted: 2019-03-29

## 2019-03-29 LAB
ANION GAP SERPL CALCULATED.3IONS-SCNC: 6 MMOL/L (ref 3–11)
BASOPHILS # BLD AUTO: 0.01 10*3/MM3 (ref 0–0.2)
BASOPHILS NFR BLD AUTO: 0.1 % (ref 0–1)
BUN BLD-MCNC: 17 MG/DL (ref 9–23)
BUN/CREAT SERPL: 19.1 (ref 7–25)
CALCIUM SPEC-SCNC: 9.1 MG/DL (ref 8.7–10.4)
CHLORIDE SERPL-SCNC: 103 MMOL/L (ref 99–109)
CO2 SERPL-SCNC: 27 MMOL/L (ref 20–31)
CREAT BLD-MCNC: 0.89 MG/DL (ref 0.6–1.3)
DEPRECATED RDW RBC AUTO: 49.1 FL (ref 37–54)
EOSINOPHIL # BLD AUTO: 0 10*3/MM3 (ref 0–0.3)
EOSINOPHIL NFR BLD AUTO: 0 % (ref 0–3)
ERYTHROCYTE [DISTWIDTH] IN BLOOD BY AUTOMATED COUNT: 14.3 % (ref 11.3–14.5)
GFR SERPL CREATININE-BSD FRML MDRD: 62 ML/MIN/1.73
GLUCOSE BLD-MCNC: 150 MG/DL (ref 70–100)
GLUCOSE BLDC GLUCOMTR-MCNC: 104 MG/DL (ref 70–130)
GLUCOSE BLDC GLUCOMTR-MCNC: 116 MG/DL (ref 70–130)
GLUCOSE BLDC GLUCOMTR-MCNC: 122 MG/DL (ref 70–130)
GLUCOSE BLDC GLUCOMTR-MCNC: 138 MG/DL (ref 70–130)
HCT VFR BLD AUTO: 36.3 % (ref 34.5–44)
HGB BLD-MCNC: 11.7 G/DL (ref 11.5–15.5)
IMM GRANULOCYTES # BLD AUTO: 0.04 10*3/MM3 (ref 0–0.05)
IMM GRANULOCYTES NFR BLD AUTO: 0.3 % (ref 0–0.6)
LYMPHOCYTES # BLD AUTO: 2.1 10*3/MM3 (ref 0.6–4.8)
LYMPHOCYTES NFR BLD AUTO: 13.5 % (ref 24–44)
MCH RBC QN AUTO: 30.5 PG (ref 27–31)
MCHC RBC AUTO-ENTMCNC: 32.2 G/DL (ref 32–36)
MCV RBC AUTO: 94.8 FL (ref 80–99)
MONOCYTES # BLD AUTO: 1.7 10*3/MM3 (ref 0–1)
MONOCYTES NFR BLD AUTO: 10.9 % (ref 0–12)
NEUTROPHILS # BLD AUTO: 11.77 10*3/MM3 (ref 1.5–8.3)
NEUTROPHILS NFR BLD AUTO: 75.5 % (ref 41–71)
PLATELET # BLD AUTO: 265 10*3/MM3 (ref 150–450)
PMV BLD AUTO: 11.1 FL (ref 6–12)
POTASSIUM BLD-SCNC: 4 MMOL/L (ref 3.5–5.5)
RBC # BLD AUTO: 3.83 10*6/MM3 (ref 3.89–5.14)
SODIUM BLD-SCNC: 136 MMOL/L (ref 132–146)
WBC NRBC COR # BLD: 15.58 10*3/MM3 (ref 3.5–10.8)

## 2019-03-29 PROCEDURE — 97110 THERAPEUTIC EXERCISES: CPT

## 2019-03-29 PROCEDURE — 97161 PT EVAL LOW COMPLEX 20 MIN: CPT

## 2019-03-29 PROCEDURE — 80048 BASIC METABOLIC PNL TOTAL CA: CPT | Performed by: ORTHOPAEDIC SURGERY

## 2019-03-29 PROCEDURE — 85025 COMPLETE CBC W/AUTO DIFF WBC: CPT | Performed by: ORTHOPAEDIC SURGERY

## 2019-03-29 PROCEDURE — 25010000002 KETOROLAC TROMETHAMINE PER 15 MG: Performed by: ORTHOPAEDIC SURGERY

## 2019-03-29 PROCEDURE — 82962 GLUCOSE BLOOD TEST: CPT

## 2019-03-29 PROCEDURE — 97166 OT EVAL MOD COMPLEX 45 MIN: CPT

## 2019-03-29 PROCEDURE — 25010000003 CEFAZOLIN IN DEXTROSE 2-4 GM/100ML-% SOLUTION: Performed by: ORTHOPAEDIC SURGERY

## 2019-03-29 RX ORDER — FLUTICASONE PROPIONATE 50 MCG
2 SPRAY, SUSPENSION (ML) NASAL DAILY
Status: DISCONTINUED | OUTPATIENT
Start: 2019-03-29 | End: 2019-04-01 | Stop reason: HOSPADM

## 2019-03-29 RX ORDER — BISACODYL 10 MG
10 SUPPOSITORY, RECTAL RECTAL ONCE
Status: COMPLETED | OUTPATIENT
Start: 2019-03-29 | End: 2019-03-29

## 2019-03-29 RX ORDER — PSEUDOEPHEDRINE HCL 30 MG
100 TABLET ORAL 2 TIMES DAILY PRN
Qty: 60 EACH | Refills: 0 | Status: SHIPPED | OUTPATIENT
Start: 2019-03-29

## 2019-03-29 RX ORDER — SODIUM PHOSPHATE, DIBASIC AND SODIUM PHOSPHATE, MONOBASIC 7; 19 G/133ML; G/133ML
1 ENEMA RECTAL ONCE
Status: COMPLETED | OUTPATIENT
Start: 2019-03-29 | End: 2019-03-29

## 2019-03-29 RX ORDER — CHLORTHALIDONE 25 MG/1
25 TABLET ORAL NIGHTLY
Status: DISCONTINUED | OUTPATIENT
Start: 2019-03-29 | End: 2019-04-01 | Stop reason: HOSPADM

## 2019-03-29 RX ORDER — OXYCODONE HYDROCHLORIDE 5 MG/1
5 TABLET ORAL EVERY 4 HOURS PRN
Qty: 40 TABLET | Refills: 0 | Status: SHIPPED | OUTPATIENT
Start: 2019-03-29 | End: 2019-04-07

## 2019-03-29 RX ORDER — CETIRIZINE HYDROCHLORIDE 10 MG/1
10 TABLET ORAL DAILY
Status: DISCONTINUED | OUTPATIENT
Start: 2019-03-29 | End: 2019-04-01 | Stop reason: HOSPADM

## 2019-03-29 RX ADMIN — ZOLPIDEM TARTRATE 10 MG: 5 TABLET ORAL at 20:49

## 2019-03-29 RX ADMIN — CARVEDILOL 12.5 MG: 12.5 TABLET, FILM COATED ORAL at 08:28

## 2019-03-29 RX ADMIN — Medication 10 MG: at 10:27

## 2019-03-29 RX ADMIN — KETOROLAC TROMETHAMINE 15 MG: 30 INJECTION, SOLUTION INTRAMUSCULAR; INTRAVENOUS at 13:49

## 2019-03-29 RX ADMIN — LEVOTHYROXINE SODIUM 50 MCG: 50 TABLET ORAL at 05:52

## 2019-03-29 RX ADMIN — OXYCODONE HYDROCHLORIDE 5 MG: 5 TABLET ORAL at 19:21

## 2019-03-29 RX ADMIN — POTASSIUM CHLORIDE 10 MEQ: 750 CAPSULE, EXTENDED RELEASE ORAL at 08:28

## 2019-03-29 RX ADMIN — CETIRIZINE HYDROCHLORIDE 10 MG: 10 TABLET, FILM COATED ORAL at 12:38

## 2019-03-29 RX ADMIN — MAGNESIUM HYDROXIDE 10 ML: 2400 SUSPENSION ORAL at 12:38

## 2019-03-29 RX ADMIN — SERTRALINE HYDROCHLORIDE 100 MG: 100 TABLET ORAL at 08:28

## 2019-03-29 RX ADMIN — LOSARTAN POTASSIUM 100 MG: 25 TABLET, FILM COATED ORAL at 08:28

## 2019-03-29 RX ADMIN — OXYCODONE HYDROCHLORIDE 5 MG: 5 TABLET ORAL at 10:36

## 2019-03-29 RX ADMIN — OXYCODONE HYDROCHLORIDE 10 MG: 5 TABLET ORAL at 14:40

## 2019-03-29 RX ADMIN — CHLORTHALIDONE 25 MG: 25 TABLET ORAL at 20:49

## 2019-03-29 RX ADMIN — PANTOPRAZOLE SODIUM 40 MG: 40 TABLET, DELAYED RELEASE ORAL at 08:29

## 2019-03-29 RX ADMIN — FLUTICASONE PROPIONATE 2 SPRAY: 50 SPRAY, METERED NASAL at 13:49

## 2019-03-29 RX ADMIN — CARVEDILOL 12.5 MG: 12.5 TABLET, FILM COATED ORAL at 20:49

## 2019-03-29 RX ADMIN — LABETALOL 20 MG/4 ML (5 MG/ML) INTRAVENOUS SYRINGE 10 MG: at 20:06

## 2019-03-29 RX ADMIN — ATENOLOL 25 MG: 50 TABLET ORAL at 08:28

## 2019-03-29 RX ADMIN — SODIUM PHOSPHATE 1 ENEMA: 7; 19 ENEMA RECTAL at 12:21

## 2019-03-29 RX ADMIN — ASPIRIN 81 MG: 81 TABLET, COATED ORAL at 08:28

## 2019-03-29 RX ADMIN — DOCUSATE SODIUM 100 MG: 100 CAPSULE, LIQUID FILLED ORAL at 12:38

## 2019-03-29 RX ADMIN — CEFAZOLIN SODIUM 2 G: 2 INJECTION, SOLUTION INTRAVENOUS at 03:58

## 2019-03-29 RX ADMIN — DOCUSATE SODIUM 100 MG: 100 CAPSULE, LIQUID FILLED ORAL at 20:51

## 2019-03-29 RX ADMIN — KETOROLAC TROMETHAMINE 15 MG: 30 INJECTION, SOLUTION INTRAMUSCULAR; INTRAVENOUS at 21:05

## 2019-03-29 RX ADMIN — BISACODYL 10 MG: 5 TABLET, COATED ORAL at 12:38

## 2019-03-30 PROBLEM — K21.9 GERD (GASTROESOPHAGEAL REFLUX DISEASE): Status: ACTIVE | Noted: 2019-03-30

## 2019-03-30 LAB
GLUCOSE BLDC GLUCOMTR-MCNC: 111 MG/DL (ref 70–130)
GLUCOSE BLDC GLUCOMTR-MCNC: 112 MG/DL (ref 70–130)
GLUCOSE BLDC GLUCOMTR-MCNC: 132 MG/DL (ref 70–130)
GLUCOSE BLDC GLUCOMTR-MCNC: 136 MG/DL (ref 70–130)

## 2019-03-30 PROCEDURE — 25010000002 KETOROLAC TROMETHAMINE PER 15 MG: Performed by: ORTHOPAEDIC SURGERY

## 2019-03-30 PROCEDURE — 82962 GLUCOSE BLOOD TEST: CPT

## 2019-03-30 PROCEDURE — 97116 GAIT TRAINING THERAPY: CPT

## 2019-03-30 PROCEDURE — 97535 SELF CARE MNGMENT TRAINING: CPT | Performed by: OCCUPATIONAL THERAPIST

## 2019-03-30 RX ORDER — MAGNESIUM CARB/ALUMINUM HYDROX 105-160MG
296 TABLET,CHEWABLE ORAL ONCE
Status: DISCONTINUED | OUTPATIENT
Start: 2019-03-30 | End: 2019-04-01 | Stop reason: HOSPADM

## 2019-03-30 RX ORDER — SUCRALFATE 1 G/1
1 TABLET ORAL
Status: DISCONTINUED | OUTPATIENT
Start: 2019-03-30 | End: 2019-04-01 | Stop reason: HOSPADM

## 2019-03-30 RX ORDER — CALCIUM CARBONATE 200(500)MG
2 TABLET,CHEWABLE ORAL 3 TIMES DAILY PRN
Status: DISCONTINUED | OUTPATIENT
Start: 2019-03-30 | End: 2019-04-01 | Stop reason: HOSPADM

## 2019-03-30 RX ORDER — FAMOTIDINE 20 MG/1
20 TABLET, FILM COATED ORAL NIGHTLY
Status: DISCONTINUED | OUTPATIENT
Start: 2019-03-30 | End: 2019-04-01 | Stop reason: HOSPADM

## 2019-03-30 RX ADMIN — OXYCODONE HYDROCHLORIDE 5 MG: 5 TABLET ORAL at 05:18

## 2019-03-30 RX ADMIN — ASPIRIN 81 MG: 81 TABLET, COATED ORAL at 08:45

## 2019-03-30 RX ADMIN — KETOROLAC TROMETHAMINE 15 MG: 30 INJECTION, SOLUTION INTRAMUSCULAR; INTRAVENOUS at 07:19

## 2019-03-30 RX ADMIN — LOSARTAN POTASSIUM 100 MG: 25 TABLET, FILM COATED ORAL at 08:44

## 2019-03-30 RX ADMIN — SERTRALINE HYDROCHLORIDE 100 MG: 100 TABLET ORAL at 08:45

## 2019-03-30 RX ADMIN — FAMOTIDINE 20 MG: 20 TABLET ORAL at 19:36

## 2019-03-30 RX ADMIN — CARVEDILOL 12.5 MG: 12.5 TABLET, FILM COATED ORAL at 08:44

## 2019-03-30 RX ADMIN — POTASSIUM CHLORIDE 10 MEQ: 750 CAPSULE, EXTENDED RELEASE ORAL at 08:45

## 2019-03-30 RX ADMIN — ZOLPIDEM TARTRATE 10 MG: 5 TABLET ORAL at 19:35

## 2019-03-30 RX ADMIN — SUCRALFATE 1 G: 1 TABLET ORAL at 17:40

## 2019-03-30 RX ADMIN — DOCUSATE SODIUM 100 MG: 100 CAPSULE, LIQUID FILLED ORAL at 05:18

## 2019-03-30 RX ADMIN — OXYCODONE HYDROCHLORIDE 5 MG: 5 TABLET ORAL at 11:18

## 2019-03-30 RX ADMIN — CALCIUM CARBONATE 2 TABLET: 500 TABLET ORAL at 10:06

## 2019-03-30 RX ADMIN — ATENOLOL 25 MG: 50 TABLET ORAL at 08:45

## 2019-03-30 RX ADMIN — CARVEDILOL 12.5 MG: 12.5 TABLET, FILM COATED ORAL at 19:37

## 2019-03-30 RX ADMIN — SUCRALFATE 1 G: 1 TABLET ORAL at 14:33

## 2019-03-30 RX ADMIN — PANTOPRAZOLE SODIUM 40 MG: 40 TABLET, DELAYED RELEASE ORAL at 08:45

## 2019-03-30 RX ADMIN — CETIRIZINE HYDROCHLORIDE 10 MG: 10 TABLET, FILM COATED ORAL at 08:45

## 2019-03-30 RX ADMIN — ATORVASTATIN CALCIUM 10 MG: 10 TABLET, FILM COATED ORAL at 19:36

## 2019-03-30 RX ADMIN — CHLORTHALIDONE 25 MG: 25 TABLET ORAL at 19:37

## 2019-03-30 RX ADMIN — OXYCODONE HYDROCHLORIDE 5 MG: 5 TABLET ORAL at 19:37

## 2019-03-30 RX ADMIN — LEVOTHYROXINE SODIUM 50 MCG: 50 TABLET ORAL at 05:18

## 2019-03-30 RX ADMIN — BISACODYL 10 MG: 5 TABLET, COATED ORAL at 05:18

## 2019-03-30 RX ADMIN — FLUTICASONE PROPIONATE 2 SPRAY: 50 SPRAY, METERED NASAL at 08:44

## 2019-03-31 LAB
GLUCOSE BLDC GLUCOMTR-MCNC: 111 MG/DL (ref 70–130)
GLUCOSE BLDC GLUCOMTR-MCNC: 114 MG/DL (ref 70–130)
GLUCOSE BLDC GLUCOMTR-MCNC: 136 MG/DL (ref 70–130)
GLUCOSE BLDC GLUCOMTR-MCNC: 162 MG/DL (ref 70–130)

## 2019-03-31 PROCEDURE — 97110 THERAPEUTIC EXERCISES: CPT | Performed by: OCCUPATIONAL THERAPIST

## 2019-03-31 PROCEDURE — 97535 SELF CARE MNGMENT TRAINING: CPT | Performed by: OCCUPATIONAL THERAPIST

## 2019-03-31 PROCEDURE — 82962 GLUCOSE BLOOD TEST: CPT

## 2019-03-31 PROCEDURE — 97116 GAIT TRAINING THERAPY: CPT

## 2019-03-31 RX ADMIN — SUCRALFATE 1 G: 1 TABLET ORAL at 18:11

## 2019-03-31 RX ADMIN — LOSARTAN POTASSIUM 100 MG: 25 TABLET, FILM COATED ORAL at 08:00

## 2019-03-31 RX ADMIN — SUCRALFATE 1 G: 1 TABLET ORAL at 12:50

## 2019-03-31 RX ADMIN — SUCRALFATE 1 G: 1 TABLET ORAL at 08:00

## 2019-03-31 RX ADMIN — LEVOTHYROXINE SODIUM 50 MCG: 50 TABLET ORAL at 05:11

## 2019-03-31 RX ADMIN — POTASSIUM CHLORIDE 10 MEQ: 750 CAPSULE, EXTENDED RELEASE ORAL at 08:00

## 2019-03-31 RX ADMIN — ZOLPIDEM TARTRATE 10 MG: 5 TABLET ORAL at 21:17

## 2019-03-31 RX ADMIN — FLUTICASONE PROPIONATE 2 SPRAY: 50 SPRAY, METERED NASAL at 08:06

## 2019-03-31 RX ADMIN — CETIRIZINE HYDROCHLORIDE 10 MG: 10 TABLET, FILM COATED ORAL at 08:01

## 2019-03-31 RX ADMIN — OXYCODONE HYDROCHLORIDE 10 MG: 5 TABLET ORAL at 19:40

## 2019-03-31 RX ADMIN — PANTOPRAZOLE SODIUM 40 MG: 40 TABLET, DELAYED RELEASE ORAL at 08:00

## 2019-03-31 RX ADMIN — CARVEDILOL 12.5 MG: 12.5 TABLET, FILM COATED ORAL at 21:17

## 2019-03-31 RX ADMIN — CARVEDILOL 12.5 MG: 12.5 TABLET, FILM COATED ORAL at 08:00

## 2019-03-31 RX ADMIN — INSULIN LISPRO 2 UNITS: 100 INJECTION, SOLUTION INTRAVENOUS; SUBCUTANEOUS at 21:17

## 2019-03-31 RX ADMIN — OXYCODONE HYDROCHLORIDE 5 MG: 5 TABLET ORAL at 14:32

## 2019-03-31 RX ADMIN — FAMOTIDINE 20 MG: 20 TABLET ORAL at 21:17

## 2019-03-31 RX ADMIN — OXYCODONE HYDROCHLORIDE 5 MG: 5 TABLET ORAL at 05:11

## 2019-03-31 RX ADMIN — ATORVASTATIN CALCIUM 10 MG: 10 TABLET, FILM COATED ORAL at 21:17

## 2019-03-31 RX ADMIN — ATENOLOL 25 MG: 50 TABLET ORAL at 08:06

## 2019-03-31 RX ADMIN — CHLORTHALIDONE 25 MG: 25 TABLET ORAL at 21:17

## 2019-03-31 RX ADMIN — OXYCODONE HYDROCHLORIDE 10 MG: 5 TABLET ORAL at 10:58

## 2019-03-31 RX ADMIN — ASPIRIN 81 MG: 81 TABLET, COATED ORAL at 08:06

## 2019-03-31 RX ADMIN — SERTRALINE HYDROCHLORIDE 100 MG: 100 TABLET ORAL at 08:06

## 2019-03-31 RX ADMIN — OXYCODONE HYDROCHLORIDE 5 MG: 5 TABLET ORAL at 01:33

## 2019-04-01 VITALS
SYSTOLIC BLOOD PRESSURE: 123 MMHG | RESPIRATION RATE: 16 BRPM | HEART RATE: 69 BPM | WEIGHT: 190.7 LBS | TEMPERATURE: 97.5 F | HEIGHT: 63 IN | DIASTOLIC BLOOD PRESSURE: 62 MMHG | BODY MASS INDEX: 33.79 KG/M2 | OXYGEN SATURATION: 92 %

## 2019-04-01 LAB
GLUCOSE BLDC GLUCOMTR-MCNC: 134 MG/DL (ref 70–130)
GLUCOSE BLDC GLUCOMTR-MCNC: 150 MG/DL (ref 70–130)

## 2019-04-01 PROCEDURE — 97110 THERAPEUTIC EXERCISES: CPT | Performed by: OCCUPATIONAL THERAPIST

## 2019-04-01 PROCEDURE — 97116 GAIT TRAINING THERAPY: CPT

## 2019-04-01 PROCEDURE — 97535 SELF CARE MNGMENT TRAINING: CPT | Performed by: OCCUPATIONAL THERAPIST

## 2019-04-01 PROCEDURE — 82962 GLUCOSE BLOOD TEST: CPT

## 2019-04-01 RX ORDER — CETIRIZINE HYDROCHLORIDE 10 MG/1
10 TABLET ORAL DAILY
Start: 2019-04-02

## 2019-04-01 RX ORDER — CALCIUM CARBONATE 200(500)MG
2 TABLET,CHEWABLE ORAL 3 TIMES DAILY PRN
Start: 2019-04-01

## 2019-04-01 RX ADMIN — ASPIRIN 81 MG: 81 TABLET, COATED ORAL at 08:30

## 2019-04-01 RX ADMIN — OXYCODONE HYDROCHLORIDE 5 MG: 5 TABLET ORAL at 12:27

## 2019-04-01 RX ADMIN — SUCRALFATE 1 G: 1 TABLET ORAL at 08:30

## 2019-04-01 RX ADMIN — ATENOLOL 25 MG: 50 TABLET ORAL at 08:30

## 2019-04-01 RX ADMIN — SUCRALFATE 1 G: 1 TABLET ORAL at 12:27

## 2019-04-01 RX ADMIN — PANTOPRAZOLE SODIUM 40 MG: 40 TABLET, DELAYED RELEASE ORAL at 08:30

## 2019-04-01 RX ADMIN — FLUTICASONE PROPIONATE 2 SPRAY: 50 SPRAY, METERED NASAL at 08:34

## 2019-04-01 RX ADMIN — ACETAMINOPHEN 650 MG: 325 TABLET ORAL at 05:05

## 2019-04-01 RX ADMIN — CETIRIZINE HYDROCHLORIDE 10 MG: 10 TABLET, FILM COATED ORAL at 08:30

## 2019-04-01 RX ADMIN — POTASSIUM CHLORIDE 10 MEQ: 750 CAPSULE, EXTENDED RELEASE ORAL at 08:30

## 2019-04-01 RX ADMIN — OXYCODONE HYDROCHLORIDE 10 MG: 5 TABLET ORAL at 09:38

## 2019-04-01 RX ADMIN — SERTRALINE HYDROCHLORIDE 100 MG: 100 TABLET ORAL at 08:30

## 2019-04-01 RX ADMIN — INSULIN LISPRO 2 UNITS: 100 INJECTION, SOLUTION INTRAVENOUS; SUBCUTANEOUS at 12:27

## 2019-04-01 RX ADMIN — CARVEDILOL 12.5 MG: 12.5 TABLET, FILM COATED ORAL at 08:30

## 2019-04-01 RX ADMIN — LEVOTHYROXINE SODIUM 50 MCG: 50 TABLET ORAL at 05:05

## 2019-04-01 RX ADMIN — LOSARTAN POTASSIUM 100 MG: 25 TABLET, FILM COATED ORAL at 08:30

## 2019-04-01 RX ADMIN — OXYCODONE HYDROCHLORIDE 5 MG: 5 TABLET ORAL at 05:05

## 2019-04-01 NOTE — DISCHARGE PLACEMENT REQUEST
"Marshall Finnegan (73 y.o. Female)     Date of Birth Social Security Number Address Home Phone MRN    1945  [address] [place] [address]  [id number]    Oriental orthodox Marital Status          Taoist        Admission Date Admission Type Admitting Provider Attending Provider Department, Room/Bed    3/28/19 Elective Francisco Sims MD Done[place], Ryan Patrick, MD [facility] 3G, S364/1    Discharge Date Discharge Disposition Discharge Destination         Rehab Facility or Unit (DC - External)              Attending Provider:  Francisco Sims MD    Allergies:  No Known Allergies    Isolation:  None   Infection:  None   Code Status:  CPR    Ht:  160 cm (62.99\")   Wt:  86.5 kg (190 lb 11.2 oz)    Admission Cmt:  None   Principal Problem:  S/P reverse total shoulder arthroplasty, right [Z96.611]                 Active Insurance as of 3/28/2019     Primary Coverage     Payor Plan Insurance Group Employer/Plan Group    MEDICARE MEDICARE A & B      Payor Plan Address Payor Plan Phone Number Payor Plan Fax Number Effective Dates    [address] [phone]  2008 - None Entered    [facility] [id number]       Subscriber Name Subscriber Birth Date Member ID       MARSHALL FINNEGAN 1945 [id number]           Secondary Coverage     Payor Plan Insurance Group Employer/Plan Group    [facility] SUPP [id number]     Payor Plan Address Payor Plan Phone Number Payor Plan Fax Number Effective Dates    [address]   2016 - None Entered    [facility] [id number]       Subscriber Name Subscriber Birth Date Member ID       MARSHALL FINNEGAN 1945 [id number]                 Emergency Contacts      (Rel.) Home Phone Work Phone Mobile Phone    chuy navarro (Mother) -- -- [phone]               Discharge Summary      Julia Peres APRN at 2019  9:14 AM          Patient Name: Marshall Finnegan  MRN: [id number]  : " 1945  DOS: 4/1/2019    Attending: Francisco Sims MD    Primary Care Provider: Isidra Garcia DO    Date of Admission:.3/28/2019  9:59 AM    Date of Discharge:  4/1/2019    Discharge Diagnosis:   S/P reverse total shoulder arthroplasty, right    Right shoulder pain    Glenohumeral arthritis, right    HTN (hypertension)    HLD (hyperlipidemia)    Prediabetes    Hypothyroid    Leukocytosis, likely reactive    Acute postoperative pain    GERD (gastroesophageal reflux disease)      Hospital Course  Patient is a 73 y.o. female presented for  right reverse total shoulder arthroplasty by Dr. Sims under GA. She tolerated surgery well and was admitted for further medical management. Her shoulder was painful with limited range of motion for the last year and a half.     She does have a history of hypertension, hyperlipidemia, coronary artery disease.  She is followed by Dr. Galindo, cardiology, and had preop cardiac clearance.    Patient was provided pain medications as needed for pain control, along with interscalene nerve block infusion of Ropivacaine- out prior to discharge.  Adjustments were made to pain medications to optimize postop pain management. Risks and benefits of opiate medications discussed with patient.    The patient was seen by OT and was taught exercises for her right arm.  The patient used an IS for atelectasis prophylaxis and mechanicals for DVT prophylaxis.  Home medications were resumed as appropriate, and labs were monitored and remained fairly stable.     With the progress she has made, she is ready for DC home today.    The patient will have an arrow pump ( instructed on it during this admit)  Discussed with patient regarding plan and she shows understanding and agreement.          Procedures Performed  DATE OF OPERATION: 03/28/19     PREOPERATIVE DIAGNOSIS:Right shoulder osteoarthritis with full thickness rotator cuff tear      POSTOPERATIVE  "DIAGNOSES:  Right shoulder osteoarthritis with full thickness rotator cuff tear  Right shoulder biceps partial thickness tear (long head)     PROCEDURES PERFORMED:  1. Right reverse total shoulder arthroplasty.    2. Right biceps tenotomy.       SURGEON: Francisco Sims MD    Pertinent Test Results:    I reviewed the patient's new clinical results.   Results from last 7 days   Lab Units 19  0653   WBC 10*3/mm3 15.58*   HEMOGLOBIN g/dL 11.7   HEMATOCRIT % 36.3   PLATELETS 10*3/mm3 265     Results from last 7 days   Lab Units 19  0653   SODIUM mmol/L 136   POTASSIUM mmol/L 4.0   CHLORIDE mmol/L 103   CO2 mmol/L 27.0   BUN mg/dL 17   CREATININE mg/dL 0.89   CALCIUM mg/dL 9.1   GLUCOSE mg/dL 150*     Results for MARSHALL FINNEGAN (MRN [id number]) as of 2019 09:05   Ref. Range 3/31/2019 10:55 3/31/2019 15:40 3/31/2019 21:15 2019 07:30   Glucose Latest Ref Range: 70 - 130 mg/dL 136 (H) 114 162 (H) 134 (H)     I reviewed the patient's new imaging including images and reports.      Physical therapy: (3/31) -Pt increased gait distance to 210 feet with CGA and no AD, limited by fatigue. Pt anticipates discharge to rehab tomorrow, will continue to progress mobility as able.     Discharge Assessment:    Vital Signs  Visit Vitals  /62   Pulse 69   Temp 97.5 °F (36.4 °C)   Resp 16   Ht 160 cm (62.99\")   Wt 86.5 kg (190 lb 11.2 oz)   SpO2 92%   Breastfeeding? No   BMI 33.79 kg/m²     Temp (24hrs), Av.3 °F (36.8 °C), Min:97.5 °F (36.4 °C), Max:99.4 °F (37.4 °C)      General Appearance:    Alert, cooperative, in no acute distress   Lungs:     Clear to auscultation,respirations regular, even and                   unlabored    Heart:    Regular rhythm and normal rate, normal S1 and S2   Abdomen:     Normal bowel sounds, no masses, no organomegaly, soft        non-tender, non-distended, no guarding, no rebound                 tenderness   Extremities:   RUE sling. Nerve cath out. Aquacel CDI. Good " movement and cap refill right digits   Pulses:   Pulses palpable and equal bilaterally   Skin:   No bleeding, bruising or rash   Neurologic:   Cranial nerves 2 - 12 grossly intact, sensation intact       Discharge Disposition: [facility]    Discharge Medications     Discharge Medications      New Medications      Instructions Start Date   calcium carbonate 500 MG chewable tablet  Commonly known as:  TUMS   2 tablets, Oral, 3 Times Daily PRN      cetirizine 10 MG tablet  Commonly known as:  zyrTEC   10 mg, Oral, Daily   Start Date:  4/2/2019     docusate sodium 100 MG capsule   100 mg, Oral, 2 Times Daily PRN      oxyCODONE 5 MG immediate release tablet  Commonly known as:  ROXICODONE   5 mg, Oral, Every 4 Hours PRN         Continue These Medications      Instructions Start Date   aspirin 81 MG EC tablet   81 mg, Oral, Daily      atenolol 25 MG tablet  Commonly known as:  TENORMIN   25 mg, Oral, Daily      atorvastatin 10 MG tablet  Commonly known as:  LIPITOR   10 mg, Oral, Nightly      carvedilol 12.5 MG tablet  Commonly known as:  COREG   12.5 mg, Oral, 2 Times Daily With Meals      chlorthalidone 25 MG tablet  Commonly known as:  HYGROTON   25 mg, Oral, Daily      levothyroxine 50 MCG tablet  Commonly known as:  SYNTHROID, LEVOTHROID   50 mcg, Oral, Daily      losartan 50 MG tablet  Commonly known as:  COZAAR   100 mg, Oral, Daily      ondansetron 4 MG tablet  Commonly known as:  ZOFRAN   4 mg, Oral, Every 8 Hours PRN      pantoprazole 40 MG EC tablet  Commonly known as:  PROTONIX   40 mg, Oral, Daily      potassium chloride 8 MEQ CR tablet  Commonly known as:  KLOR-CON   8 mEq, Oral, Daily      sertraline 100 MG tablet  Commonly known as:  ZOLOFT   50 mg, Oral, Daily         Stop These Medications    benzoyl peroxide 5 % external liquid  Generic drug:  benzoyl peroxide            Discharge Diet: Consistent carb diet    Activity at Discharge: AMANDO Self    Continues current post-op course  Mobilize with  PT as tolerated per protocol  Aquacel bandage to remain in place until follow-up.  Sling except for bathing and dressing  Elbow wrist hand range of motion only  To be done 2-3 times a day  Okay to shower on postoperative day 4 when block is DC'd      Follow-up Appointments  Dr. Sims per his orders    Discharge took over 30 min.    LOIDA Baugh  04/01/19  9:14 AM    Electronically signed by Julia Peres APRN at 4/1/2019  9:18 AM

## 2019-04-01 NOTE — PLAN OF CARE
Problem: Patient Care Overview  Goal: Plan of Care Review  Outcome: Ongoing (interventions implemented as appropriate)   03/31/19 2017   Coping/Psychosocial   Plan of Care Reviewed With patient   Plan of Care Review   Progress improving   OTHER   Outcome Summary ibrahima has been up to chair was able to ambulate in hallway with PT and has been up to the bathroom throughout the day Has complained of blanca upper shoulder pain and wrist pain throughout the day. PO pain meds administered Arrow pump increased at 1220 to 10ml/hr. arrow pump to be pulled tomorrow as infusion is almost finished. Denies numbness or tingling. Moderate hand  aquacel dressing CDI. Vitas stable has been voiding spontaneously anticipates discharge tomorrow neighbor to provide transportation will continue to monitor        Problem: Shoulder Arthroplasty (Adult)  Goal: Signs and Symptoms of Listed Potential Problems Will be Absent, Minimized or Managed (Shoulder Arthroplasty)  Outcome: Ongoing (interventions implemented as appropriate)   03/31/19 2017   Goal/Outcome Evaluation   Problems Assessed (Shoulder Arthro) all   Problems Present (Shoulder Arthro) pain;functional deficit;situational response

## 2019-04-01 NOTE — PROGRESS NOTES
Orthopedic Progress Note      Patient: Milagros Ortiz  YOB: 1945     Date of Admission: 3/28/2019  9:59 AM Medical Record Number: [id number]     Attending Physician: Francisco Sims MD    Status Post:  Procedure(s):  TOTAL SHOULDER REVERSE ARTHROPLASTY WITH BICEPS TENOTOMY RIGHT Post Operative Day Number: 4    Subjective : No new orthopaedic complaints     Pain Relief: some relief with present medication.     Systemic Complaints: No Complaints  Vitals:    03/31/19 2110 04/01/19 0430 04/01/19 0727 04/01/19 0830   BP: 125/74 126/68 144/67 123/62   BP Location: Left arm Left arm Left arm    Patient Position: Lying Lying Lying    Pulse: 75 85 68 69   Resp:  18 16    Temp:  99.4 °F (37.4 °C) 97.5 °F (36.4 °C)    TempSrc:  Axillary     SpO2:  91% 92%    Weight:       Height:           Physical Exam: 73 y.o. female    General Appearance:       Alert, cooperative, in no acute distress                  Extremities:    Dressing Clean, Dry and Intact                No clinical sign of DVT        Able to do good movements of digits    Pulses:   Pulses palpable and equal bilaterally           Diagnostic Tests:     Results from last 7 days   Lab Units 03/29/19  0653   WBC 10*3/mm3 15.58*   HEMOGLOBIN g/dL 11.7   HEMATOCRIT % 36.3   PLATELETS 10*3/mm3 265     Results from last 7 days   Lab Units 03/29/19  0653   SODIUM mmol/L 136   POTASSIUM mmol/L 4.0   CHLORIDE mmol/L 103   CO2 mmol/L 27.0   BUN mg/dL 17   CREATININE mg/dL 0.89   GLUCOSE mg/dL 150*   CALCIUM mg/dL 9.1         No results found for: URICACID  No results found for: CRYSTAL  Microbiology Results (last 10 days)     ** No results found for the last 240 hours. **        Xr Shoulder 1 View Right    Result Date: 3/29/2019  Status post right total shoulder arthroplasty without complication.  D:  03/29/2019 E:  03/29/2019  This report was finalized on 3/29/2019 10:21 AM by Dr. Cruz Crooks.          Current Medications:  Scheduled  Meds:  aspirin 81 mg Oral Daily   atenolol 25 mg Oral Daily   atorvastatin 10 mg Oral Nightly   carvedilol 12.5 mg Oral Q12H   cetirizine 10 mg Oral Daily   chlorthalidone 25 mg Oral Nightly   famotidine 20 mg Oral Nightly   fluticasone 2 spray Each Nare Daily   insulin lispro 0-7 Units Subcutaneous 4x Daily With Meals & Nightly   levothyroxine 50 mcg Oral Q AM   losartan 100 mg Oral Daily   magnesium citrate 296 mL Oral Once   pantoprazole 40 mg Oral Daily   potassium chloride 10 mEq Oral Daily   sertraline 100 mg Oral Daily   sucralfate 1 g Oral TID AC   zolpidem 10 mg Oral Nightly     Continuous Infusions:  Ropivacine HCl-NaCl 6 mL/hr Last Rate: 6 mL/hr (03/31/19 1420)   sodium chloride 50 mL/hr Last Rate: Stopped (03/29/19 0830)     PRN Meds:.•  acetaminophen **OR** acetaminophen **OR** acetaminophen  •  bisacodyl  •  calcium carbonate  •  dextrose  •  dextrose  •  docusate sodium  •  glucagon (human recombinant)  •  HYDROmorphone **AND** naloxone  •  influenza vaccine  •  labetalol  •  magnesium hydroxide  •  ondansetron  •  ondansetron **OR** ondansetron  •  oxyCODONE  •  oxyCODONE  •  simethicone  •  sodium chloride    Assessment: Status post  TOTAL SHOULDER REVERSE ARTHROPLASTY RIGHT    Patient Active Problem List   Diagnosis   • Right shoulder pain   • HTN (hypertension)   • HLD (hyperlipidemia)   • Prediabetes   • Hypothyroid   • Glenohumeral arthritis, right   • S/P reverse total shoulder arthroplasty, right   • Leukocytosis, likely reactive   • Acute postoperative pain   • GERD (gastroesophageal reflux disease)       PLAN:   Continues current post-op course  Mobilize with PT as tolerated per protocol  Aquacel bandage to remain in place until follow-up.  Sling except for bathing and dressing  Elbow wrist hand range of motion only  To be done 2-3 times a day  Okay to shower on postoperative day 4 when block is DC'd        Weight Bearing: NWB  Discharge Plan: OK to plan for discharge in  today to SNF  from  orthopadic perspective.    David Calabrese MD    Date: 4/1/2019    Time: 11:30 AM

## 2019-04-01 NOTE — DISCHARGE SUMMARY
Patient Name: Milagros Ortiz  MRN: [id number]  : 1945  DOS: 2019    Attending: Francisco Sims MD    Primary Care Provider: Isidra Garcia DO    Date of Admission:.3/28/2019  9:59 AM    Date of Discharge:  2019    Discharge Diagnosis:   S/P reverse total shoulder arthroplasty, right    Right shoulder pain    Glenohumeral arthritis, right    HTN (hypertension)    HLD (hyperlipidemia)    Prediabetes    Hypothyroid    Leukocytosis, likely reactive    Acute postoperative pain    GERD (gastroesophageal reflux disease)      Hospital Course  Patient is a 73 y.o. female presented for right reverse total shoulder arthroplasty by Dr. Sims under GA. She tolerated surgery well and was admitted for further medical management. Her shoulder was painful with limited range of motion for the last year and a half.     She does have a history of hypertension, hyperlipidemia, coronary artery disease.  She is followed by Dr. Galindo, cardiology, and had preop cardiac clearance.    Patient was provided pain medications as needed for pain control, along with interscalene nerve block infusion of Ropivacaine- out prior to discharge.  Adjustments were made to pain medications to optimize postop pain management. Risks and benefits of opiate medications discussed with patient.    The patient was seen by OT and was taught exercises for her right arm.  The patient used an IS for atelectasis prophylaxis and mechanicals for DVT prophylaxis.  Home medications were resumed as appropriate, and labs were monitored and remained fairly stable.     With the progress she has made, she is ready for DC home today.    The patient will have an arrow pump ( instructed on it during this admit)  Discussed with patient regarding plan and she shows understanding and agreement.          Procedures Performed  DATE OF OPERATION: 19     PREOPERATIVE DIAGNOSIS:Right shoulder osteoarthritis with full thickness rotator cuff  "tear      POSTOPERATIVE DIAGNOSES:  Right shoulder osteoarthritis with full thickness rotator cuff tear  Right shoulder biceps partial thickness tear (long head)     PROCEDURES PERFORMED:  1. Right reverse total shoulder arthroplasty.    2. Right biceps tenotomy.       SURGEON: Francisco Sims MD    Pertinent Test Results:    I reviewed the patient's new clinical results.   Results from last 7 days   Lab Units 19  0653   WBC 10*3/mm3 15.58*   HEMOGLOBIN g/dL 11.7   HEMATOCRIT % 36.3   PLATELETS 10*3/mm3 265     Results from last 7 days   Lab Units 19  0653   SODIUM mmol/L 136   POTASSIUM mmol/L 4.0   CHLORIDE mmol/L 103   CO2 mmol/L 27.0   BUN mg/dL 17   CREATININE mg/dL 0.89   CALCIUM mg/dL 9.1   GLUCOSE mg/dL 150*     Results for MARSHALL FINNEGAN (MRN [id number]) as of 2019 09:05   Ref. Range 3/31/2019 10:55 3/31/2019 15:40 3/31/2019 21:15 2019 07:30   Glucose Latest Ref Range: 70 - 130 mg/dL 136 (H) 114 162 (H) 134 (H)     I reviewed the patient's new imaging including images and reports.      Physical therapy: (3/31) -Pt increased gait distance to 210 feet with CGA and no AD, limited by fatigue. Pt anticipates discharge to rehab tomorrow, will continue to progress mobility as able.     Discharge Assessment:    Vital Signs  Visit Vitals  /62   Pulse 69   Temp 97.5 °F (36.4 °C)   Resp 16   Ht 160 cm (62.99\")   Wt 86.5 kg (190 lb 11.2 oz)   SpO2 92%   Breastfeeding? No   BMI 33.79 kg/m²     Temp (24hrs), Av.3 °F (36.8 °C), Min:97.5 °F (36.4 °C), Max:99.4 °F (37.4 °C)      General Appearance:    Alert, cooperative, in no acute distress   Lungs:     Clear to auscultation,respirations regular, even and                   unlabored    Heart:    Regular rhythm and normal rate, normal S1 and S2   Abdomen:     Normal bowel sounds, no masses, no organomegaly, soft        non-tender, non-distended, no guarding, no rebound                 tenderness   Extremities:   RUE sling. Nerve cath out. " Aquacel CDI. Good movement and cap refill right digits   Pulses:   Pulses palpable and equal bilaterally   Skin:   No bleeding, bruising or rash   Neurologic:   Cranial nerves 2 - 12 grossly intact, sensation intact       Discharge Disposition: [facility]    Discharge Medications     Discharge Medications      New Medications      Instructions Start Date   calcium carbonate 500 MG chewable tablet  Commonly known as:  TUMS   2 tablets, Oral, 3 Times Daily PRN      cetirizine 10 MG tablet  Commonly known as:  zyrTEC   10 mg, Oral, Daily   Start Date:  4/2/2019     docusate sodium 100 MG capsule   100 mg, Oral, 2 Times Daily PRN      oxyCODONE 5 MG immediate release tablet  Commonly known as:  ROXICODONE   5 mg, Oral, Every 4 Hours PRN         Continue These Medications      Instructions Start Date   aspirin 81 MG EC tablet   81 mg, Oral, Daily      atenolol 25 MG tablet  Commonly known as:  TENORMIN   25 mg, Oral, Daily      atorvastatin 10 MG tablet  Commonly known as:  LIPITOR   10 mg, Oral, Nightly      carvedilol 12.5 MG tablet  Commonly known as:  COREG   12.5 mg, Oral, 2 Times Daily With Meals      chlorthalidone 25 MG tablet  Commonly known as:  HYGROTON   25 mg, Oral, Daily      levothyroxine 50 MCG tablet  Commonly known as:  SYNTHROID, LEVOTHROID   50 mcg, Oral, Daily      losartan 50 MG tablet  Commonly known as:  COZAAR   100 mg, Oral, Daily      ondansetron 4 MG tablet  Commonly known as:  ZOFRAN   4 mg, Oral, Every 8 Hours PRN      pantoprazole 40 MG EC tablet  Commonly known as:  PROTONIX   40 mg, Oral, Daily      potassium chloride 8 MEQ CR tablet  Commonly known as:  KLOR-CON   8 mEq, Oral, Daily      sertraline 100 MG tablet  Commonly known as:  ZOLOFT   50 mg, Oral, Daily         Stop These Medications    benzoyl peroxide 5 % external liquid  Generic drug:  benzoyl peroxide            Discharge Diet: Consistent carb diet    Activity at Discharge: AMANDO Self    Continues current post-op  course  Mobilize with PT as tolerated per protocol  Aquacel bandage to remain in place until follow-up.  Sling except for bathing and dressing  Elbow wrist hand range of motion only  To be done 2-3 times a day  Okay to shower on postoperative day 4 when block is DC'd      Follow-up Appointments  Dr. Sims per his orders    Discharge took over 30 min.    LOIDA Baugh  04/01/19  9:14 AM

## 2019-04-01 NOTE — THERAPY TREATMENT NOTE
Acute Care - Physical Therapy Treatment Note  [facility]     Patient Name: Milagros Ortiz  : 1945  MRN: [id number]  Today's Date: 2019  Onset of Illness/Injury or Date of Surgery: 19  Date of Referral to PT: 19  Referring Physician: MD Bethany     Admit Date: 3/28/2019    Visit Dx:    ICD-10-CM ICD-9-CM   1. Impaired functional mobility, balance, gait, and endurance Z74.09 V49.89   2. Impaired mobility and ADLs Z74.09 799.89     Patient Active Problem List   Diagnosis   • Right shoulder pain   • HTN (hypertension)   • HLD (hyperlipidemia)   • Prediabetes   • Hypothyroid   • Glenohumeral arthritis, right   • S/P reverse total shoulder arthroplasty, right   • Leukocytosis, likely reactive   • Acute postoperative pain   • GERD (gastroesophageal reflux disease)       Therapy Treatment    Rehabilitation Treatment Summary     Row Name 19 0847             Treatment Time/Intention    Discipline  physical therapist  -LM      Document Type  therapy note (daily note)  -LM      Subjective Information  no complaints reports vomiting earlier this AM  -LM      Mode of Treatment  physical therapy;individual therapy  -LM      Patient/Family Observations  Pt received sitting UIC, brace to RUE.  -LM      Care Plan Review  care plan/treatment goals reviewed;patient/other agree to care plan  -LM      Therapy Frequency (PT Clinical Impression)  daily  -LM      Patient Effort  excellent  -LM      Existing Precautions/Restrictions  fall;right;shoulder;non-weight bearing brace to RUE, NWB RUE  -LM      Recorded by [LM] Lucretia Suazo, PT 19 1027      Row Name 19 0847             Vital Signs    O2 Delivery Pre Treatment  room air  -LM      O2 Delivery Intra Treatment  room air  -LM      O2 Delivery Post Treatment  room air  -LM      Pre Patient Position  Sitting  -LM      Intra Patient Position  Standing  -LM      Post Patient Position  Sitting  -LM      Recorded by [LM] Lucretia Suazo,  PT 04/01/19 1027      Row Name 04/01/19 0847             Cognitive Assessment/Intervention- PT/OT    Affect/Mental Status (Cognitive)  WNL  -LM      Orientation Status (Cognition)  oriented x 4  -LM      Follows Commands (Cognition)  WNL  -LM      Cognitive Function (Cognitive)  safety deficit  -LM      Safety Deficit (Cognitive)  mild deficit;safety precautions awareness;safety precautions follow-through/compliance  -LM      Recorded by [LM] Lucretia Suazo, PT 04/01/19 1027      Row Name 04/01/19 0847             Safety Issues, Functional Mobility    Safety Issues Affecting Function (Mobility)  safety precaution awareness;safety precautions follow-through/compliance  -LM      Impairments Affecting Function (Mobility)  balance;strength;pain  -LM      Recorded by [LM] Lucretia Suazo, PT 04/01/19 1027      Row Name 04/01/19 0847             Mobility Assessment/Intervention    Extremity Weight-bearing Status  right upper extremity  -LM      Right Upper Extremity (Weight-bearing Status)  non weight-bearing (NWB)  -LM      Recorded by [LM] Lucretia Suazo, PT 04/01/19 1027      Row Name 04/01/19 0847             Bed Mobility Assessment/Treatment    Comment (Bed Mobility)  Not tested - pt received and left sitting UIC  -LM      Recorded by [LM] Lucretia Suazo, PT 04/01/19 1027      Row Name 04/01/19 0847             Transfer Assessment/Treatment    Transfer Assessment/Treatment  sit-stand transfer;stand-sit transfer  -LM      Comment (Transfers)  Cues for safety and to slowly approach chair and reach for armrest with LUE to slow descent into sitting.  -LM      Recorded by [LM] Lucretia Suazo, PT 04/01/19 1027      Row Name 04/01/19 0847             Sit-Stand Transfer    Sit-Stand Esmeralda (Transfers)  contact guard;verbal cues  -LM      Assistive Device (Sit-Stand Transfers)  other (see comments) none  -LM      Recorded by [LM] Lucretia Suazo, PT 04/01/19 1027      Row Name 04/01/19 0847              Stand-Sit Transfer    Stand-Sit [place] (Transfers)  contact guard;verbal cues  -LM      Assistive Device (Stand-Sit Transfers)  other (see comments) none  -LM      Recorded by [LM] Lucretia Suazo, PT 04/01/19 1027      Row Name 04/01/19 0847             Gait/Stairs Assessment/Training    35097 - Gait Training Minutes   9  -LM      [place] Level (Gait)  contact guard;verbal cues  -LM      Assistive Device (Gait)  other (see comments) none  -LM      Distance in Feet (Gait)  250  -LM      Pattern (Gait)  step-through  -LM      Deviations/Abnormal Patterns (Gait)  bilateral deviations;sinai decreased;stride length decreased  -LM      Bilateral Gait Deviations  heel strike decreased  -LM      Comment (Gait/Stairs)  Pt ambulated with step-through pattern at slow pace, cues for upright posture, increase step length, heel strike, and widen base of support. Cues for scanning environment to avoid obstacles with RUE. Gait distance limited by fatigue.  -LM      Recorded by [LM] Lucretia Suazo, PT 04/01/19 1027      Row Name 04/01/19 0847             Positioning and Restraints    Pre-Treatment Position  sitting in chair/recliner  -LM      Post Treatment Position  chair  -LM      In Chair  notified nsg;sitting;call light within reach;encouraged to call for assist;exit alarm on  -LM      Recorded by [LM] Lucretia Suazo, PT 04/01/19 1027      Row Name 04/01/19 0847             Pain Scale: Numbers Pre/Post-Treatment    Pain Scale: Numbers, Pretreatment  0/10 - no pain  -LM      Pain Scale: Numbers, Post-Treatment  0/10 - no pain  -LM      Recorded by [LM] Lucretia Suazo, PT 04/01/19 1027      Row Name                Wound 03/28/19 1317 Right shoulder incision    Wound - Properties Group Date first assessed: 03/28/19 [MR] Time first assessed: 1317 [MR] Side: Right [MR] Location: shoulder [MR] Type: incision [MR] Recorded by:  [MR] Angeles Keating RN 03/28/19 1317    Row Name 04/01/19 0847             Plan of Care  Review    Plan of Care Reviewed With  patient  -LM      Recorded by [LM] Lucretia Suazo, PT 04/01/19 1027      Row Name 04/01/19 0847             Outcome Summary/Treatment Plan (PT)    Daily Summary of Progress (PT)  progress toward functional goals is good  -LM      Recorded by [LM] Lucretia Suazo, PT 04/01/19 1027        User Key  (r) = Recorded By, (t) = Taken By, (c) = Cosigned By    Initials Name Effective Dates Discipline    LM Lucretia Suazo, PT 04/03/18 -  PT    Angeles Chamorro, RN 09/07/18 -  Nurse          Wound 03/28/19 1317 Right shoulder incision (Active)   Dressing Appearance dry;intact;no drainage 3/31/2019  7:40 PM   Closure CECY 3/31/2019  7:40 PM   Base dressing in place, unable to visualize 3/31/2019  7:40 PM   Drainage Amount none 3/31/2019  7:40 PM   Dressing Care, Wound other (see comments) 3/31/2019  7:40 PM           Physical Therapy Education     Title: PT OT SLP Therapies (Done)     Topic: Physical Therapy (Done)     Point: Mobility training (Done)     Learning Progress Summary           Patient Acceptance, E,D, VU,NR by RIVAS at 4/1/2019  8:47 AM    Comment:  Reviewed NWB status, correct gait mechanics, benefits of mobility, safe car transfer.    Acceptance, E,D, VU,NR by KUMAR at 3/31/2019  7:30 AM    Comment:  Reviewed gait mechanics, benefits of mobility    Acceptance, E,D, VU,NR by KUMAR at 3/30/2019  2:15 PM    Comment:  Reviewed gait mechanics, benefits of mobility, NWB R UE    Acceptance, E,D, VU,NR by RIVAS at 3/29/2019  9:41 AM    Comment:  Reviewed NWB status, benefits of activity, indications for use of SPC, progression of POC.                   Point: Home exercise program (Done)     Learning Progress Summary           Patient Acceptance, E,D, VU,NR by KUMAR at 3/31/2019  7:30 AM    Comment:  Reviewed gait mechanics, benefits of mobility    Acceptance, E,D, VU,NR by KUMAR at 3/30/2019  2:15 PM    Comment:  Reviewed gait mechanics, benefits of mobility, NWB R UE    Acceptance, E,D, VU,NR by  RIVAS at 3/29/2019  9:41 AM    Comment:  Reviewed NWB status, benefits of activity, indications for use of SPC, progression of POC.                   Point: Body mechanics (Done)     Learning Progress Summary           Patient Acceptance, E,D, VU,NR by  at 4/1/2019  8:47 AM    Comment:  Reviewed NWB status, correct gait mechanics, benefits of mobility, safe car transfer.    Acceptance, E,D, VU,NR by  at 3/31/2019  7:30 AM    Comment:  Reviewed gait mechanics, benefits of mobility    Acceptance, E,D, VU,NR by  at 3/30/2019  2:15 PM    Comment:  Reviewed gait mechanics, benefits of mobility, NWB R UE    Acceptance, E,D, VU,NR by RIVAS at 3/29/2019  9:41 AM    Comment:  Reviewed NWB status, benefits of activity, indications for use of SPC, progression of POC.                   Point: Precautions (Done)     Learning Progress Summary           Patient Acceptance, E,D, VU,NR by  at 4/1/2019  8:47 AM    Comment:  Reviewed NWB status, correct gait mechanics, benefits of mobility, safe car transfer.    Acceptance, E,D, VU,NR by  at 3/31/2019  7:30 AM    Comment:  Reviewed gait mechanics, benefits of mobility    Acceptance, E,D, VU,NR by  at 3/30/2019  2:15 PM    Comment:  Reviewed gait mechanics, benefits of mobility, NWB R UE    Acceptance, E,D, VU,NR by  at 3/29/2019  9:41 AM    Comment:  Reviewed NWB status, benefits of activity, indications for use of SPC, progression of POC.                               User Key     Initials Effective Dates Name Provider Type Discipline     04/03/18 -  Ever See, PT Physical Therapist PT     04/03/18 -  Lucretia Suazo PT Physical Therapist PT                PT Recommendation and Plan  Anticipated Discharge Disposition (PT): skilled nursing facility  Planned Therapy Interventions (PT Eval): balance training, bed mobility training, gait training, home exercise program, patient/family education, stair training, strengthening, transfer training  Therapy Frequency (PT  Clinical Impression): daily  Outcome Summary/Treatment Plan (PT)  Daily Summary of Progress (PT): progress toward functional goals is good  Anticipated Equipment Needs at Discharge (PT): other (see comments)(defer to rehab)  Patient/Family Concerns, Equipment Needs at Discharge (PT): No PT equipment needs if discharging home; issued SPC.  Anticipated Discharge Disposition (PT): skilled nursing facility  Patient/Family Concerns, Anticipated Discharge Disposition (PT): SNF vs. home with assist. Pt reports no one available to assist.  Plan of Care Reviewed With: patient  Outcome Summary: Pt ambulated 250 feet without use of assistive device. Discussed use of SPC if needed for improved balance. Pt continues to require CGA for transfers and ambulation. Anticipating d/c to rehab today.   Outcome Measures     Row Name 04/01/19 0847 03/31/19 1049 03/31/19 0730       How much help from another person do you currently need...    Turning from your back to your side while in flat bed without using bedrails?  3  -LM  --  3  -MJ    Moving from lying on back to sitting on the side of a flat bed without bedrails?  3  -LM  --  3  -MJ    Moving to and from a bed to a chair (including a wheelchair)?  3  -LM  --  3  -MJ    Standing up from a chair using your arms (e.g., wheelchair, bedside chair)?  3  -LM  --  3  -MJ    Climbing 3-5 steps with a railing?  3  -LM  --  3  -MJ    To walk in hospital room?  3  -LM  --  3  -MJ    AM-PAC 6 Clicks Score  18  -LM  --  18  -MJ       How much help from another is currently needed...    Putting on and taking off regular lower body clothing?  --  2  -AR  --    Bathing (including washing, rinsing, and drying)  --  2  -AR  --    Toileting (which includes using toilet bed pan or urinal)  --  3  -AR  --    Putting on and taking off regular upper body clothing  --  2  -AR  --    Taking care of personal grooming (such as brushing teeth)  --  3  -AR  --    Eating meals  --  3  -AR  --    Score  --  15   -AR  --       Functional Assessment    Outcome Measure Options  -PAC 6 Clicks Basic Mobility (PT)  -  --  -PAC 6 Clicks Basic Mobility (PT)  -    Row Name 03/30/19 1448 03/30/19 1415          How much help from another person do you currently need...    Turning from your back to your side while in flat bed without using bedrails?  --  3  -MJ     Moving from lying on back to sitting on the side of a flat bed without bedrails?  --  3  -MJ     Moving to and from a bed to a chair (including a wheelchair)?  --  3  -MJ     Standing up from a chair using your arms (e.g., wheelchair, bedside chair)?  --  3  -MJ     Climbing 3-5 steps with a railing?  --  3  -MJ     To walk in hospital room?  --  3  -MJ     AM-PAC 6 Clicks Score  --  18  -MJ        How much help from another is currently needed...    Putting on and taking off regular lower body clothing?  2  -AR  --     Bathing (including washing, rinsing, and drying)  2  -AR  --     Toileting (which includes using toilet bed pan or urinal)  2  -AR  --     Putting on and taking off regular upper body clothing  2  -AR  --     Taking care of personal grooming (such as brushing teeth)  3  -AR  --     Eating meals  3  -AR  --     Score  14  -AR  --        Functional Assessment    Outcome Measure Options  AM-PAC 6 Clicks Daily Activity (OT)  -AR  -PAC 6 Clicks Basic Mobility (PT)  -       User Key  (r) = Recorded By, (t) = Taken By, (c) = Cosigned By    Initials Name Provider Type    Julia Lenz, OT Occupational Therapist    Ever Avelar, PT Physical Therapist    Lucretia Cramer, PT Physical Therapist         Time Calculation:   PT Charges     Row Name 04/01/19 0847             Time Calculation    Start Time  0847  -LM      PT Received On  04/01/19  -      PT Goal Re-Cert Due Date  04/08/19  -         Time Calculation- PT    Total Timed Code Minutes- PT  9 minute(s)  -LM         Timed Charges    75517 - Gait Training Minutes   9  -LM        User  Key  (r) = Recorded By, (t) = Taken By, (c) = Cosigned By    Initials Name Provider Type    LM Lucretia Suazo, PT Physical Therapist        Therapy Charges for Today     Code Description Service Date Service Provider Modifiers Qty    [id number] HC GAIT TRAINING EA 15 MIN 4/1/2019 Lucretia Suazo, PT GP 1          PT G-Codes  Outcome Measure Options: AM-PAC 6 Clicks Basic Mobility (PT)  AM-PAC 6 Clicks Score: 18  Score: 15    Lucretia Suazo PT  4/1/2019

## 2019-04-01 NOTE — PROGRESS NOTES
Case Management Discharge Note    Final Note: Ms. Ortiz has a skilled bed at [facility] today, if medically ready.  Ms. Ortiz is aware and she stated that her family will be transporting her to the facility by pesonal vehicle.  Please call report to [facility] at  [phone].  CM has faxed the DC summary.  Please have a copy of the DC summary, AVS and any scripts in the DC packet.  Thank you.    Destination - Selection Complete      Service Provider Request Status Selected Services Address Phone Number Fax Number    [facility] Selected Skilled Nursing [address], [place] [id number] [phone]      Durable Medical Equipment      No service has been selected for the patient.      Dialysis/Infusion      No service has been selected for the patient.      Home Medical Care      No service has been selected for the patient.      Therapy      No service has been selected for the patient.      Community Resources      No service has been selected for the patient.             Final Discharge Disposition Code: 03 - skilled nursing facility (SNF)

## 2019-04-01 NOTE — DISCHARGE PLACEMENT REQUEST
"Marshall Finnegan (73 y.o. Female)     Date of Birth Social Security Number Address Home Phone MRN    1945  [address] [facility] [address]  [id number]    Holiness Marital Status          Zoroastrian        Admission Date Admission Type Admitting Provider Attending Provider Department, Room/Bed    3/28/19 Elective Francisco Sims MD [place], Ryan Patrick, MD [facility] 3G, S364/1    Discharge Date Discharge Disposition Discharge Destination         Rehab Facility or Unit (DC - External)              Attending Provider:  Francisco Sims MD    Allergies:  No Known Allergies    Isolation:  None   Infection:  None   Code Status:  CPR    Ht:  160 cm (62.99\")   Wt:  86.5 kg (190 lb 11.2 oz)    Admission Cmt:  None   Principal Problem:  S/P reverse total shoulder arthroplasty, right [Z96.611]                 Active Insurance as of 3/28/2019     Primary Coverage     Payor Plan Insurance Group Employer/Plan Group    MEDICARE MEDICARE A & B      Payor Plan Address Payor Plan Phone Number Payor Plan Fax Number Effective Dates    [address] [phone]  4/1/2008 - None Entered    [facility] [id number]       Subscriber Name Subscriber Birth Date Member ID       MARSHALL FINNEGAN 1945 [id number]           Secondary Coverage     Payor Plan Insurance Group Employer/Plan Group    [facility] SUPP [id number]     Payor Plan Address Payor Plan Phone Number Payor Plan Fax Number Effective Dates    [address]   12/1/2016 - None Entered    [facility] [id number]       Subscriber Name Subscriber Birth Date Member ID       MARSHALL FINNEGAN 1945 [id number]                 Emergency Contacts      (Rel.) Home Phone Work Phone Mobile Phone    chuy navarro (Mother) -- -- [phone]        [address]  [address] [facility] [address]  Phone:  [phone]  Fax:          Patient:     Marshall Staton" Diana MRN:  [id number]   [address] [place] [address] :  1945  SSN:    Phone: No phone on record Sex:  F      INSURANCE PAYOR PLAN GROUP # SUBSCRIBER ID   Primary:  Secondary:    MEDICARE  ANTH [employer]OILAN CROSS [id number]  [id number]    [id number]  [id number]   Admitting Diagnosis: Glenohumeral arthritis, right [M19.011]  Order Date:  Mar 28, 2019         Inpatient Admission       (Order ID: [id number])     Diagnosis:         Priority:  Routine Expected Date:   Expiration Date:        Interval:   Count:    Level of Care: Med/Surg [1]  Diagnosis: Glenohumeral arthritis, right [[id number]]  Certification: I certify that inpatient hospital services are medically necessary for greater than 2 midnights.     Specimen Type:   Specimen Source:   Specimen Taken Date:   Specimen Taken Time:                   Authorizing Provider:Francisco Sims MD  Authorizing Provider's NPI: [id number]  Order Entered By: Francisco Sims MD 3/28/2019  2:23 PM     Electronically signed by: Francisco Sims MD 3/28/2019  2:23 PM               History & Physical      Bill Shen MD at 3/28/2019  1:18 PM          Patient Name: Milagros Ortiz  MRN: [id number]  : 1945  DOS: 3/28/2019    Attending: Francisco Sims MD    Primary Care Provider: Isidra Garcia DO      Chief complaint:  Right shoulder pain    Subjective   Patient is a 73 y.o. female presented for scheduled surgery by Dr. Sims.    She anticipates a right total shoulder replacement today.  Her shoulder has been painful with limited range of motion for the last year and a half.    When seen preop she is doing well.  She denies pain or other complaints. She denies nausea, shortness of breath or chest pain. No hx of DVT or PE.    She does have a history of hypertension, hyperlipidemia, coronary artery disease.  She is followed by Dr. Galindo, cardiology, and had preop cardiac clearance.    (Above is  noted, I agree.  Doing well when seen in her room afterwards she underwent right reverse total shoulder arthroplasty and right biceps tenotomy, procedure was done under general anesthesia, was tolerated well, she had an interscalene nerve block catheter placed .  Good pain control, no nausea or vomiting, no shortness of breath, no difficulty swallowing.)  [place]       Allergies:  No Known Allergies    Meds:  Medications Prior to Admission   Medication Sig Dispense Refill Last Dose   • aspirin 81 MG EC tablet Take 81 mg by mouth Daily.   3/28/2019 at 0600   • atenolol (TENORMIN) 25 MG tablet Take 25 mg by mouth Daily.   3/28/2019 at 0600   • atorvastatin (LIPITOR) 10 MG tablet Take 10 mg by mouth Every Night.   3/27/2019 at Unknown time   • benzoyl peroxide 5 % external liquid use as directed by Dr. Sims 148 g 0 3/28/2019 at Unknown time   • carvedilol (COREG) 12.5 MG tablet Take 12.5 mg by mouth 2 (Two) Times a Day With Meals.   3/27/2019 at 0600   • chlorthalidone (HYGROTON) 25 MG tablet Take 25 mg by mouth Daily.   3/27/2019 at Unknown time   • levothyroxine (SYNTHROID, LEVOTHROID) 50 MCG tablet Take 50 mcg by mouth Daily.   3/28/2019 at 0600   • pantoprazole (PROTONIX) 40 MG EC tablet Take 40 mg by mouth Daily.   3/28/2019 at 0600   • potassium chloride (KLOR-CON) 8 MEQ CR tablet Take 8 mEq by mouth Daily.   3/28/2019 at 0600   • sertraline (ZOLOFT) 100 MG tablet Take 100 mg by mouth Daily.   3/28/2019 at 0600   • losartan (COZAAR) 50 MG tablet Take 100 mg by mouth Daily.   3/27/2019   • ondansetron (ZOFRAN) 4 MG tablet Take 1 tablet by mouth Every 8 (Eight) Hours As Needed for post op pain and nausea 30 tablet 0        History:   Past Medical History:   Diagnosis Date   • Arthritis    • Coronary artery disease    • Disease of thyroid gland    • GERD (gastroesophageal reflux disease)    • History of gastric ulcer    • Hyperlipidemia     STATES NEVER HAD HIGH CHOLESTEROL, GIVEN AFTER MI AS PREVENTION   •  Hypertension    • MI (myocardial infarction) (CMS/[facility]) 09/15/2017   • Panic attacks    • Wears reading eyeglasses      Past Surgical History:   Procedure Laterality Date   • BUNIONECTOMY     • CARDIAC CATHETERIZATION      STENT PLACEMENT X1-2017   • COLONOSCOPY     • ENDOSCOPY     • HERNIA REPAIR      X2   • HYSTERECTOMY     • KNEE ARTHROPLASTY Bilateral      History reviewed. No pertinent family history.  Social History     Tobacco Use   • Smoking status: Never Smoker   • Smokeless tobacco: Never Used   Substance Use Topics   • Alcohol use: Yes     Alcohol/week: 0.6 oz     Types: 1 Glasses of wine per week     Frequency: Never     Comment: OCC   • Drug use: No   She is  and has 2 children.  She is retired .    Review of Systems  Pertinent items are noted in HPI, all other systems reviewed and negative    Vital Signs  Visit Vitals  /79 (BP Location: Left arm, Patient Position: Lying)   Pulse 55   Temp 98.5 °F (36.9 °C) (Temporal)   Resp 18   SpO2 96%   Breastfeeding? No       Physical Exam:    General Appearance:    Alert, cooperative, in no acute distress   Head:    Normocephalic, without obvious abnormality, atraumatic   Eyes:            Lids and lashes normal, conjunctivae and sclerae normal, no   icterus, no pallor, corneas clear   Ears:    Ears appear intact with no abnormalities noted   Neck:   No adenopathy, supple, trachea midline, no thyromegaly   Lungs:     Clear to auscultation,respirations regular, even and                   unlabored    Heart:    Regular rhythm and normal rate, normal S1 and S2, no            murmur, no gallop   Abdomen:     Normal bowel sounds, no masses, no organomegaly, soft        non-tender, non-distended, no guarding, no rebound                 tenderness   Genitalia:    Deferred   Extremities:   Moves all extremities well, no edema, no cyanosis, no              Redness  (Postoperative exam: RUE in a sling, CDI dressing/aquacel on right  shoulder.  Interscalene nerve block cath present.  Distal pulses, cap refill, movements of fingers, wrist, intact.)     Pulses:   Pulses palpable and equal bilaterally   Skin:   No bleeding, bruising or rash   Neurologic:   Cranial nerves 2 - 12 grossly intact, sensation intact      I reviewed the patient's new clinical results.     Results for MARSHALL FINNEGAN (MRN [id number]) as of 3/28/2019 13:21   Ref. Range 3/18/2019 10:41   Glucose Latest Ref Range: 70 - 100 mg/dL 110 (H)   Sodium Latest Ref Range: 132 - 146 mmol/L 141   Potassium Latest Ref Range: 3.5 - 5.5 mmol/L 3.9   CO2 Latest Ref Range: 20.0 - 31.0 mmol/L 28.0   Chloride Latest Ref Range: 99 - 109 mmol/L 104   Anion Gap Latest Ref Range: 3.0 - 11.0 mmol/L 9.0   Creatinine Latest Ref Range: 0.60 - 1.30 mg/dL 0.84   BUN Latest Ref Range: 9 - 23 mg/dL 32 (H)   BUN/Creatinine Ratio Latest Ref Range: 7.0 - 25.0  38.1 (H)   Calcium Latest Ref Range: 8.7 - 10.4 mg/dL 9.9   eGFR Non African Am Latest Ref Range: >60 mL/min/1.73 66   Alkaline Phosphatase Latest Ref Range: 25 - 100 U/L 68   Total Protein Latest Ref Range: 5.7 - 8.2 g/dL 7.2   ALT (SGPT) Latest Ref Range: 7 - 40 U/L 21   AST (SGOT) Latest Ref Range: 0 - 33 U/L 22   Total Bilirubin Latest Ref Range: 0.3 - 1.2 mg/dL 0.3   Albumin Latest Ref Range: 3.20 - 4.80 g/dL 4.53   Globulin Latest Units: gm/dL 2.7   A/G Ratio Latest Ref Range: 1.5 - 2.5 g/dL 1.7   Hemoglobin A1C Latest Ref Range: 4.80 - 5.60 % 5.90 (H)   Protime Latest Ref Range: 11.2 - 14.3 Seconds 12.7   INR Latest Ref Range: 0.85 - 1.16  1.00   PTT Latest Ref Range: 24.0 - 37.0 seconds 28.6   WBC Latest Ref Range: 3.50 - 10.80 10*3/mm3 10.09   RBC Latest Ref Range: 3.89 - 5.14 10*6/mm3 4.28   Hemoglobin Latest Ref Range: 11.5 - 15.5 g/dL 13.1   Hematocrit Latest Ref Range: 34.5 - 44.0 % 39.8   RDW Latest Ref Range: 11.3 - 14.5 % 14.1   MCV Latest Ref Range: 80.0 - 99.0 fL 93.0   MCH Latest Ref Range: 27.0 - 31.0 pg 30.6   MCHC Latest Ref Range:  32.0 - 36.0 g/dL 32.9   MPV Latest Ref Range: 6.0 - 12.0 fL 10.9   Platelets Latest Ref Range: 150 - 450 10*3/mm3 350     Assessment and Plan:     S/p right reverse total shoulder arthroplasty with biceps tenotomy.    Right shoulder pain    HTN (hypertension)    HLD (hyperlipidemia)    Prediabetes    Hypothyroid      Plan  1. PT/OT- RUE sling, NWB  2. Pain control-prns, interscalene block  3. IS-encourage  4. DVT proph- [facility]  5. Bowel regimen  6. Resume home medications per Dr. Sims  7. Monitor post-op labs  8. DC planning for home, likely tomorrow    HTN, HLD  - Continue home atenolol, statin, Coreg, Cozaar  - Monitor BP   - Labetalol PRN for SBP>170    PreDM  - hgb A1c on 3/18/19 5.9  - Accuchecks ACHS with low dose SSI    Hypothyroid  - Continue home Synthroid      LOIDA Baugh  03/28/19  1:21 PM   I have personally performed the evaluation on this patient. My history is consistent  with HPI obtained. My exam findings are listed above. I have personally reviewed and discussed the above formulated treatment plan with pt, and MARTINE MARISCAL.wy.      Electronically signed by Bill Shen MD at 3/28/2019  5:42 PM     Dorcas Suarez APRN at 3/28/2019 11:40 AM     Attestation signed by Francisco Sims MD at 3/28/2019 11:48 AM    I have seen and examined this patient and agree with note as written, will proceed to OR for surgery as scheduled                   Pre-Op H&P  Milagros Ortiz  [id number]  1945    Chief complaint: Right shoulder pain    HPI:    Patient is a 73 y.o.female who presents with right shoulder pain and found to have right shoulder osteoarthritis.  Here today for right reverse total shoulder arthroplasty     Review of Systems:  General ROS: negative for chills, fever or skin lesions;  No changes since last office visit  Cardiovascular ROS: no chest pain or dyspnea on exertion.  History of cardiac stent 2017.  +cardiac clearance  Respiratory ROS: no cough, shortness  of breath, or wheezing    Allergies: No Known Allergies    Home Meds:    No current facility-administered medications on file prior to encounter.      No current outpatient medications on file prior to encounter.       PMH:   Past Medical History:   Diagnosis Date   • Arthritis    • Coronary artery disease    • Disease of thyroid gland    • GERD (gastroesophageal reflux disease)    • History of gastric ulcer    • Hyperlipidemia     STATES NEVER HAD HIGH CHOLESTEROL, GIVEN AFTER MI AS PREVENTION   • Hypertension    • MI (myocardial infarction) (CMS/[facility]) 09/15/2017   • Panic attacks    • Wears reading eyeglasses      PSH:    Past Surgical History:   Procedure Laterality Date   • BUNIONECTOMY     • CARDIAC CATHETERIZATION      STENT PLACEMENT X1-2017   • COLONOSCOPY     • ENDOSCOPY     • HERNIA REPAIR      X2   • HYSTERECTOMY     • KNEE ARTHROPLASTY Bilateral        Social History:   Tobacco:   Social History     Tobacco Use   Smoking Status Never Smoker   Smokeless Tobacco Never Used      Alcohol:     Social History     Substance and Sexual Activity   Alcohol Use Yes   • Alcohol/week: 0.6 oz   • Types: 1 Glasses of wine per week   • Frequency: Never    Comment: OCC       Vitals:           /79 (BP Location: Left arm, Patient Position: Lying)   Pulse 55   Temp 98.5 °F (36.9 °C) (Temporal)   Resp 18   SpO2 96%   Breastfeeding? No     Physical Exam:  General Appearance:    Alert, cooperative, no distress, appears stated age   Head:    Normocephalic, without obvious abnormality, atraumatic   Lungs:     Clear to auscultation bilaterally, respirations unlabored    Heart:   Regular rate and rhythm, S1 and S2 normal, no murmur, rub    or gallop    Abdomen:    Soft, non-tender.  +bowel sounds   Breast Exam:    deferred   Genitalia:    deferred   Extremities:   Extremities normal, atraumatic, no cyanosis or edema   Skin:   Skin color, texture, turgor normal, no rashes or lesions   Neurologic:   Grossly intact    Results Review  LABS:  Lab Results   Component Value Date    WBC 10.09 03/18/2019    HGB 13.1 03/18/2019    HCT 39.8 03/18/2019    MCV 93.0 03/18/2019     03/18/2019    NEUTROABS 5.71 03/18/2019    GLUCOSE 110 (H) 03/18/2019    BUN 32 (H) 03/18/2019    CREATININE 0.84 03/18/2019    EGFRIFNONA 66 03/18/2019     03/18/2019    K 3.9 03/18/2019     03/18/2019    CO2 28.0 03/18/2019    CALCIUM 9.9 03/18/2019    ALBUMIN 4.53 03/18/2019    AST 22 03/18/2019    ALT 21 03/18/2019    BILITOT 0.3 03/18/2019       RADIOLOGY:  Imaging Results (last 72 hours)     ** No results found for the last 72 hours. **          I reviewed the patient's new clinical results.    Cancer Staging (if applicable)  Cancer Patient: __ yes _x_no __unknown; If yes, clinical stage T:__ N:__M:__, stage group or __N/A    Impression: Right shoulder osteoarthritis    Plan: Right reverse total shoulder arthroplasty    Dorcas Suarez, LOIDA   3/28/2019   11:41 AM    Electronically signed by Francisco Sims MD at 3/28/2019 11:48 AM

## 2019-04-01 NOTE — PLAN OF CARE
Problem: Patient Care Overview  Goal: Plan of Care Review  Outcome: Ongoing (interventions implemented as appropriate)  Pt was admitted for right reverse total shoulder on 3/28 done by Dr. Sims. Pt has complained of 7 out of 10 elbow pain and wrist at beginning of shift treated by one dose of Oxycodone 10mg along repositioning and ice gel packs. This has decreased the pt's pain down to a tolerable pain score of 4 out of 10. Aquacel dressing is clean dry and intact as well as the nerve catheter dressing. Notice mild skin bruising around the aquacel. Pt's right hand  is weak and according to the pt this is her baseline due to arthritis. Trace edema in right arm and pulse +2 radial. Pt reports no numbness nor tingling. Pt continues to keep arm in sling/swath. Pt is voiding adequately with her last BM on 3/30. Pt walks with standby assist/gaitbelt to bathroom. Vitals stable. Pt rested well throughout shift. Plan to possibly to discharge to [facility] rehab today if medically ready. Will continue to monitor.     Problem: Shoulder Arthroplasty (Adult)  Goal: Signs and Symptoms of Listed Potential Problems Will be Absent, Minimized or Managed (Shoulder Arthroplasty)  Outcome: Ongoing (interventions implemented as appropriate)   04/01/19 2119   Goal/Outcome Evaluation   Problems Assessed (Shoulder Arthro) all   Problems Present (Shoulder Arthro) pain;functional deficit;situational response

## 2019-04-01 NOTE — PROGRESS NOTES
[facility]    Nerve Cath Post Op Call    Patient Name: Milagros Ortiz  :  1945  MRN:  [id number]  Date of Discharge:     Treatment Plan

## 2019-04-01 NOTE — PLAN OF CARE
Problem: Patient Care Overview  Goal: Plan of Care Review  Outcome: Ongoing (interventions implemented as appropriate)   04/01/19 6755   Coping/Psychosocial   Plan of Care Reviewed With patient   OTHER   Outcome Summary Pt ambulated 250 feet without use of assistive device. Discussed use of SPC if needed for improved balance. Pt continues to require CGA for transfers and ambulation. Anticipating d/c to rehab today.

## 2019-04-01 NOTE — PLAN OF CARE
Problem: Patient Care Overview  Goal: Plan of Care Review  Outcome: Ongoing (interventions implemented as appropriate)   04/01/19 1143   Coping/Psychosocial   Plan of Care Reviewed With patient   Plan of Care Review   Progress improving   OTHER   Outcome Summary Interscalene has been DC, post pain 1/10 right biceps. Pt completed bed mobility with min assist, transfers with supervision, UB ADLS with max assist. Recommend SNF-level rehab.        Problem: Shoulder Arthroplasty (Adult)  Goal: Signs and Symptoms of Listed Potential Problems Will be Absent, Minimized or Managed (Shoulder Arthroplasty)  Outcome: Unable to achieve outcome(s) by discharge Date Met: 04/01/19 04/01/19 1143   Goal/Outcome Evaluation   Problems Assessed (Shoulder Arthro) functional deficit   Problems Present (Shoulder Arthro) functional deficit

## 2019-04-01 NOTE — DISCHARGE PLACEMENT REQUEST
"Marshall Finnegan (73 y.o. Female)     Date of Birth Social Security Number Address Home Phone MRN    1945  [address]  [facility] [address]  [id number]    [place] Marital Status          Worship        Admission Date Admission Type Admitting Provider Attending Provider Department, Room/Bed    3/28/19 Elective Francisco Sims MD Done[place], Ryan Patrick, MD [facility] 3G, S364/1    Discharge Date Discharge Disposition Discharge Destination                       Attending Provider:  Francisco Sims MD    Allergies:  No Known Allergies    Isolation:  None   Infection:  None   Code Status:  CPR    Ht:  160 cm (62.99\")   Wt:  86.5 kg (190 lb 11.2 oz)    Admission Cmt:  None   Principal Problem:  S/P reverse total shoulder arthroplasty, right [Z96.611]                 Active Insurance as of 3/28/2019     Primary Coverage     Payor Plan Insurance Group Employer/Plan Group    MEDICARE MEDICARE A & B      Payor Plan Address Payor Plan Phone Number Payor Plan Fax Number Effective Dates    [address] [phone]  2008 - None Entered    [facility] [id number]       Subscriber Name Subscriber Birth Date Member ID       MARSHALL FINNEGAN 1945 [id number]           Secondary Coverage     Payor Plan Insurance Group Employer/Plan Group    [facility] SUPP [id number]     Payor Plan Address Payor Plan Phone Number Payor Plan Fax Number Effective Dates    [address]   2016 - None Entered    [facility] [id number]       Subscriber Name Subscriber Birth Date Member ID       MARSHALL FINNEGAN 1945 [id number]                 Emergency Contacts      (Rel.) Home Phone Work Phone Mobile Phone    chuy navarro (Mother) -- -- [phone]               History & Physical      Bill Shen MD at 3/28/2019  1:18 PM          Patient Name: Marshall Finnegan  MRN: [id number]  : 1945  DOS: " 3/28/2019    Attending: Francisco Sims MD    Primary Care Provider: Isidra Garcia DO      Chief complaint:  Right shoulder pain    Subjective   Patient is a 73 y.o. female presented for scheduled surgery by Dr. Sims.    She anticipates a right total shoulder replacement today.  Her shoulder has been painful with limited range of motion for the last year and a half.    When seen preop she is doing well.  She denies pain or other complaints. She denies nausea, shortness of breath or chest pain. No hx of DVT or PE.    She does have a history of hypertension, hyperlipidemia, coronary artery disease.  She is followed by Dr. Galindo, cardiology, and had preop cardiac clearance.    (Above is noted, I agree.  Doing well when seen in her room afterwards she underwent right reverse total shoulder arthroplasty and right biceps tenotomy, procedure was done under general anesthesia, was tolerated well, she had an interscalene nerve block catheter placed .  Good pain control, no nausea or vomiting, no shortness of breath, no difficulty swallowing.)  [place]       Allergies:  No Known Allergies    Meds:  Medications Prior to Admission   Medication Sig Dispense Refill Last Dose   • aspirin 81 MG EC tablet Take 81 mg by mouth Daily.   3/28/2019 at 0600   • atenolol (TENORMIN) 25 MG tablet Take 25 mg by mouth Daily.   3/28/2019 at 0600   • atorvastatin (LIPITOR) 10 MG tablet Take 10 mg by mouth Every Night.   3/27/2019 at Unknown time   • benzoyl peroxide 5 % external liquid use as directed by Dr. Sims 148 g 0 3/28/2019 at Unknown time   • carvedilol (COREG) 12.5 MG tablet Take 12.5 mg by mouth 2 (Two) Times a Day With Meals.   3/27/2019 at 0600   • chlorthalidone (HYGROTON) 25 MG tablet Take 25 mg by mouth Daily.   3/27/2019 at Unknown time   • levothyroxine (SYNTHROID, LEVOTHROID) 50 MCG tablet Take 50 mcg by mouth Daily.   3/28/2019 at 0600   • pantoprazole (PROTONIX) 40 MG EC tablet Take 40 mg by mouth Daily.    3/28/2019 at 0600   • potassium chloride (KLOR-CON) 8 MEQ CR tablet Take 8 mEq by mouth Daily.   3/28/2019 at 0600   • sertraline (ZOLOFT) 100 MG tablet Take 100 mg by mouth Daily.   3/28/2019 at 0600   • losartan (COZAAR) 50 MG tablet Take 100 mg by mouth Daily.   3/27/2019   • ondansetron (ZOFRAN) 4 MG tablet Take 1 tablet by mouth Every 8 (Eight) Hours As Needed for post op pain and nausea 30 tablet 0        History:   Past Medical History:   Diagnosis Date   • Arthritis    • Coronary artery disease    • Disease of thyroid gland    • GERD (gastroesophageal reflux disease)    • History of gastric ulcer    • Hyperlipidemia     STATES NEVER HAD HIGH CHOLESTEROL, GIVEN AFTER MI AS PREVENTION   • Hypertension    • MI (myocardial infarction) (CMS/[facility]) 09/15/2017   • Panic attacks    • Wears reading eyeglasses      Past Surgical History:   Procedure Laterality Date   • BUNIONECTOMY     • CARDIAC CATHETERIZATION      STENT PLACEMENT X1-2017   • COLONOSCOPY     • ENDOSCOPY     • HERNIA REPAIR      X2   • HYSTERECTOMY     • KNEE ARTHROPLASTY Bilateral      History reviewed. No pertinent family history.  Social History     Tobacco Use   • Smoking status: Never Smoker   • Smokeless tobacco: Never Used   Substance Use Topics   • Alcohol use: Yes     Alcohol/week: 0.6 oz     Types: 1 Glasses of wine per week     Frequency: Never     Comment: OCC   • Drug use: No   She is  and has 2 children.  She is retired .    Review of Systems  Pertinent items are noted in HPI, all other systems reviewed and negative    Vital Signs  Visit Vitals  /79 (BP Location: Left arm, Patient Position: Lying)   Pulse 55   Temp 98.5 °F (36.9 °C) (Temporal)   Resp 18   SpO2 96%   Breastfeeding? No       Physical Exam:    General Appearance:    Alert, cooperative, in no acute distress   Head:    Normocephalic, without obvious abnormality, atraumatic   Eyes:            Lids and lashes normal, conjunctivae and sclerae normal,  no   icterus, no pallor, corneas clear   Ears:    Ears appear intact with no abnormalities noted   Neck:   No adenopathy, supple, trachea midline, no thyromegaly   Lungs:     Clear to auscultation,respirations regular, even and                   unlabored    Heart:    Regular rhythm and normal rate, normal S1 and S2, no            murmur, no gallop   Abdomen:     Normal bowel sounds, no masses, no organomegaly, soft        non-tender, non-distended, no guarding, no rebound                 tenderness   Genitalia:    Deferred   Extremities:   Moves all extremities well, no edema, no cyanosis, no              Redness  (Postoperative exam: RUE in a sling, CDI dressing/aquacel on right  shoulder. Interscalene nerve block cath present.  Distal pulses, cap refill, movements of fingers, wrist, intact.)     Pulses:   Pulses palpable and equal bilaterally   Skin:   No bleeding, bruising or rash   Neurologic:   Cranial nerves 2 - 12 grossly intact, sensation intact      I reviewed the patient's new clinical results.     Results for MARSHALL FINNEGAN (MRN [id number]) as of 3/28/2019 13:21   Ref. Range 3/18/2019 10:41   Glucose Latest Ref Range: 70 - 100 mg/dL 110 (H)   Sodium Latest Ref Range: 132 - 146 mmol/L 141   Potassium Latest Ref Range: 3.5 - 5.5 mmol/L 3.9   CO2 Latest Ref Range: 20.0 - 31.0 mmol/L 28.0   Chloride Latest Ref Range: 99 - 109 mmol/L 104   Anion Gap Latest Ref Range: 3.0 - 11.0 mmol/L 9.0   Creatinine Latest Ref Range: 0.60 - 1.30 mg/dL 0.84   BUN Latest Ref Range: 9 - 23 mg/dL 32 (H)   BUN/Creatinine Ratio Latest Ref Range: 7.0 - 25.0  38.1 (H)   Calcium Latest Ref Range: 8.7 - 10.4 mg/dL 9.9   eGFR Non African Am Latest Ref Range: >60 mL/min/1.73 66   Alkaline Phosphatase Latest Ref Range: 25 - 100 U/L 68   Total Protein Latest Ref Range: 5.7 - 8.2 g/dL 7.2   ALT (SGPT) Latest Ref Range: 7 - 40 U/L 21   AST (SGOT) Latest Ref Range: 0 - 33 U/L 22   Total Bilirubin Latest Ref Range: 0.3 - 1.2 mg/dL 0.3    Albumin Latest Ref Range: 3.20 - 4.80 g/dL 4.53   Globulin Latest Units: gm/dL 2.7   A/G Ratio Latest Ref Range: 1.5 - 2.5 g/dL 1.7   Hemoglobin A1C Latest Ref Range: 4.80 - 5.60 % 5.90 (H)   Protime Latest Ref Range: 11.2 - 14.3 Seconds 12.7   INR Latest Ref Range: 0.85 - 1.16  1.00   PTT Latest Ref Range: 24.0 - 37.0 seconds 28.6   WBC Latest Ref Range: 3.50 - 10.80 10*3/mm3 10.09   RBC Latest Ref Range: 3.89 - 5.14 10*6/mm3 4.28   Hemoglobin Latest Ref Range: 11.5 - 15.5 g/dL 13.1   Hematocrit Latest Ref Range: 34.5 - 44.0 % 39.8   RDW Latest Ref Range: 11.3 - 14.5 % 14.1   MCV Latest Ref Range: 80.0 - 99.0 fL 93.0   MCH Latest Ref Range: 27.0 - 31.0 pg 30.6   MCHC Latest Ref Range: 32.0 - 36.0 g/dL 32.9   MPV Latest Ref Range: 6.0 - 12.0 fL 10.9   Platelets Latest Ref Range: 150 - 450 10*3/mm3 350     Assessment and Plan:     S/p right reverse total shoulder arthroplasty with biceps tenotomy.    Right shoulder pain    HTN (hypertension)    HLD (hyperlipidemia)    Prediabetes    Hypothyroid      Plan  1. PT/OT- RUE sling, NWB  2. Pain control-prns, interscalene block  3. IS-encourage  4. DVT proph- [facility]  5. Bowel regimen  6. Resume home medications per Dr. Sims  7. Monitor post-op labs  8. DC planning for home, likely tomorrow    HTN, HLD  - Continue home atenolol, statin, Coreg, Cozaar  - Monitor BP   - Labetalol PRN for SBP>170    PreDM  - hgb A1c on 3/18/19 5.9  - Accuchecks ACHS with low dose SSI    Hypothyroid  - Continue home Synthroid      LOIDA Baugh  03/28/19  1:21 PM   I have personally performed the evaluation on this patient. My history is consistent  with HPI obtained. My exam findings are listed above. I have personally reviewed and discussed the above formulated treatment plan with pt, and ADOLFO. [email or web address].wy.      Electronically signed by Bill Shen MD at 3/28/2019  5:42 PM     Dorcas Suarez APRN at 3/28/2019 11:40 AM     Attestation signed by Francisco Sims MD at  3/28/2019 11:48 AM    I have seen and examined this patient and agree with note as written, will proceed to OR for surgery as scheduled                   Pre-Op H&P  Milagros Ortiz  [id number]  1945    Chief complaint: Right shoulder pain    HPI:    Patient is a 73 y.o.female who presents with right shoulder pain and found to have right shoulder osteoarthritis.  Here today for right reverse total shoulder arthroplasty     Review of Systems:  General ROS: negative for chills, fever or skin lesions;  No changes since last office visit  Cardiovascular ROS: no chest pain or dyspnea on exertion.  History of cardiac stent 2017.  +cardiac clearance  Respiratory ROS: no cough, shortness of breath, or wheezing    Allergies: No Known Allergies    Home Meds:    No current facility-administered medications on file prior to encounter.      No current outpatient medications on file prior to encounter.       PMH:   Past Medical History:   Diagnosis Date   • Arthritis    • Coronary artery disease    • Disease of thyroid gland    • GERD (gastroesophageal reflux disease)    • History of gastric ulcer    • Hyperlipidemia     STATES NEVER HAD HIGH CHOLESTEROL, GIVEN AFTER MI AS PREVENTION   • Hypertension    • MI (myocardial infarction) (CMS/[facility]) 09/15/2017   • Panic attacks    • Wears reading eyeglasses      PSH:    Past Surgical History:   Procedure Laterality Date   • BUNIONECTOMY     • CARDIAC CATHETERIZATION      STENT PLACEMENT X1-2017   • COLONOSCOPY     • ENDOSCOPY     • HERNIA REPAIR      X2   • HYSTERECTOMY     • KNEE ARTHROPLASTY Bilateral        Social History:   Tobacco:   Social History     Tobacco Use   Smoking Status Never Smoker   Smokeless Tobacco Never Used      Alcohol:     Social History     Substance and Sexual Activity   Alcohol Use Yes   • Alcohol/week: 0.6 oz   • Types: 1 Glasses of wine per week   • Frequency: Never    Comment: OCC       Vitals:           /79 (BP Location: Left arm, Patient  Position: Lying)   Pulse 55   Temp 98.5 °F (36.9 °C) (Temporal)   Resp 18   SpO2 96%   Breastfeeding? No     Physical Exam:  General Appearance:    Alert, cooperative, no distress, appears stated age   Head:    Normocephalic, without obvious abnormality, atraumatic   Lungs:     Clear to auscultation bilaterally, respirations unlabored    Heart:   Regular rate and rhythm, S1 and S2 normal, no murmur, rub    or gallop    Abdomen:    Soft, non-tender.  +bowel sounds   Breast Exam:    deferred   Genitalia:    deferred   Extremities:   Extremities normal, atraumatic, no cyanosis or edema   Skin:   Skin color, texture, turgor normal, no rashes or lesions   Neurologic:   Grossly intact   Results Review  LABS:  Lab Results   Component Value Date    WBC 10.09 03/18/2019    HGB 13.1 03/18/2019    HCT 39.8 03/18/2019    MCV 93.0 03/18/2019     03/18/2019    NEUTROABS 5.71 03/18/2019    GLUCOSE 110 (H) 03/18/2019    BUN 32 (H) 03/18/2019    CREATININE 0.84 03/18/2019    EGFRIFNONA 66 03/18/2019     03/18/2019    K 3.9 03/18/2019     03/18/2019    CO2 28.0 03/18/2019    CALCIUM 9.9 03/18/2019    ALBUMIN 4.53 03/18/2019    AST 22 03/18/2019    ALT 21 03/18/2019    BILITOT 0.3 03/18/2019       RADIOLOGY:  Imaging Results (last 72 hours)     ** No results found for the last 72 hours. **          I reviewed the patient's new clinical results.    Cancer Staging (if applicable)  Cancer Patient: __ yes _x_no __unknown; If yes, clinical stage T:__ N:__M:__, stage group or __N/A    Impression: Right shoulder osteoarthritis    Plan: Right reverse total shoulder arthroplasty    Dorcas Suarez, APRN   3/28/2019   11:41 AM    Electronically signed by Francisco Sims MD at 3/28/2019 11:48 AM          Operative/Procedure Notes (most recent note)      Francisco Sims MD at 3/28/2019  1:04 PM        Operative Report Reverse Total Shoulder Arthroplasty     DATE OF OPERATION: 03/28/19     PREOPERATIVE  DIAGNOSIS:Right shoulder osteoarthritis with full thickness rotator cuff tear      POSTOPERATIVE DIAGNOSES:  Right shoulder osteoarthritis with full thickness rotator cuff tear  Right shoulder biceps partial thickness tear (long head)     PROCEDURES PERFORMED:  1. Right reverse total shoulder arthroplasty.    2. Right biceps tenotomy.       SURGEON: Francisco Sims MD     ASSISTANTS:  1. Pau Carbajal PA-C  ** Please note the physician assistant was medically necessary to assist with positioning retraction, arm positioning, care of soft tissues and closure      ANESTHESIA: General plus block.       ESTIMATED BLOOD LOSS:100mL.       COMPLICATIONS: None.       DISPOSITION: Recovery room in stable condition.      IMPLANTS:  Arthrex reverse total shoulder system  Humeral Stem: size 6, short stem universe  Humeral Tray: 36 centered, 135 deg  Polyethylene Liner: 36+3 std  Baseplate: 24, std MGS, with 20mm central screw  Glenosphere: 36+4 lateral      INDICATIONS: This is a 73-year-old female with right shoulder pain and limited function and motion secondary to osteoarthritis with full thickness rotator cuff tear . They have failed conservative treatment and after a discussion of risks, benefits, and alternatives, wished to proceed with shoulder arthroplasty.     DESCRIPTION OF PROCEDURE: On the day of surgery, the patient identified the left shoulder as the correct operative extremity. This was initialed by the surgeon with the patients's acknowledgment. The patient underwent placement of an interscalene block and was taken to the operating room and placed in the supine position. Upon induction of adequate anesthesia, the patient was brought up to the beach chair position and the shoulder and upper extremity were prepped and draped in the usual sterile fashion. Timeout confirmed the correct patient and operative extremity as well as that antibiotics were on board. A standard deltopectoral approach to the shoulder was  carried out. It was carried sharply through the skin and subcutaneous tissue. Medial and lateral flaps were developed over the deltopectoral fascia. The cephalic vein was identified and mobilized laterally with the deltoid. The subdeltoid and subpectoral spaces were mobilized and a blunt retractor was placed deep to this. The clavipectoral fascia was opened on the lateral edge of the conjoined tendon and the retractor was moved deep to this. The leading edge of the pectoralis was released exposing the long head of the biceps. This was tenosynovitic and therefore tenotomized. The 3 sisters were identified and coagulated. A subscapularis tenotomy was performed and rotator interval was released to the glenoid exposing the humeral head. The inferior capsule was released directly off the humerus to allow greater than 90° of external rotation. The anatomic neck was exposed and the humeral head osteotomy was performed in approximately  20° of retroversion. The remainder of the osteophytes were removed. The canal was then entered, reamed, and broached. The final stem impacted in in approximately 20° of retroversion. A head protector was placed. The humerus was subluxed posteriorly. The glenoid exposed. Circumferential labral excision and capsular release were performed. A  mobilization of the subscapularis was carried out as well.  A centering hole was drilled. The glenoid was gently reamed and then the  central hole for the baseplate was drilled  glenoid baseplate inserted.  Screws were then placed through the baseplate  The glenosphere was then inserted and locked into place with a set screw.  The humerus was carefully subluxed back anteriorly. A liner tray and polyethylene were placed and trialing was carried out. The appropriate final sizes were chosen and locked into place.  The shoulder was then reduced.  This allowed nearly full passive range of motion with no instability. The joint was copiously irrigated with  "orthopedic irrigation  after the final implants were assembled and locked into place.      Vancomycin powder was placed in the wound       The deltopectoral interval was approximated with 0 Vicryl, the subcutaneous tissue with 2-0 Vicryl, and the skin with nylon. A sterile dressing was placed. Anesthesia was reversed and the patient was taken to the recovery room in stable condition. All instrument, needle, and sponge counts were correct.       Francisco Sims MD, MS           Electronically signed by Francisco Sims MD at 3/28/2019  2:27 PM          Physician Progress Notes (most recent note)      Bill Shen MD at 3/31/2019 12:38 PM          IM progress note      Milagros Ortiz  [id number]  1945     LOS: 3 days     Attending: Francisco Sims MD    Primary Care Provider: Isidra Garcia DO      Chief Complaint/Reason for visit:  Right shoulder pain    Subjective   Doing well. No new problems. ( I already walked 3 times today.)  Review of Systems  General: No Fever/chills  Respiratory: :Patient denies cough, chest pain, or dyspnea   Cardiovascular: Patient denies chest pain, palpitations, dyspnea, orthopnea or swelling   Skin:No Rash or pruritis,   GI: No Nausea,vomiting or diarrhea  : No Dysuria, hematuria      Objective     Vital Signs  Visit Vitals  /69   Pulse 86   Temp 98.6 °F (37 °C) (Oral)   Resp 16   Ht 160 cm (62.99\")   Wt 86.5 kg (190 lb 11.2 oz)   SpO2 96%   Breastfeeding? No   BMI 33.79 kg/m²     Temp (24hrs), Av.5 °F (36.9 °C), Min:98.2 °F (36.8 °C), Max:98.6 °F (37 °C)      Nutrition: PO    Respiratory: RA    Physical Therapy: PT eval complete. Pt ambulated 200 feet without use of assistive device, demonstrated mild unsteadiness. Issued SPC for use as needed in home environment. Requires Laxmi for bed mobility, CGA for transfers. Will attempt stair training next session. Recommend d/c to SNF vs. home with assist; however, pt reports no available " assistance at home.     Physical Exam:     General Appearance:    Alert, cooperative, in no acute distress   Head:    Normocephalic, without obvious abnormality, atraumatic    Lungs:     Normal effort, symmetric chest rise, CTAB.    Heart:    Regular rhythm and normal rate, normal S1 and S2   Abdomen:     Normal bowel sounds, no masses, no organomegaly, soft        non-tender, non-distended, no guarding, no rebound                tenderness   Extremities:   RUE in a sling, CDI dressing/aquacel on right  shoulder. Interscalene nerve block cath present.  Distal pulses, cap refill, movements of fingers, wrist, intact    Pulses:   Pulses palpable and equal bilaterally   Skin:   No bleeding, bruising or rash          Results Review:     I reviewed the patient's new clinical results.   Results from last 7 days   Lab Units 03/29/19  0653   WBC 10*3/mm3 15.58*   HEMOGLOBIN g/dL 11.7   HEMATOCRIT % 36.3   PLATELETS 10*3/mm3 265     Results from last 7 days   Lab Units 03/29/19  0653   SODIUM mmol/L 136   POTASSIUM mmol/L 4.0   CHLORIDE mmol/L 103   CO2 mmol/L 27.0   BUN mg/dL 17   CREATININE mg/dL 0.89   CALCIUM mg/dL 9.1   GLUCOSE mg/dL 150*   I reviewed the patient's new imaging including images and reports.    All medications reviewed.     aspirin 81 mg Oral Daily   atenolol 25 mg Oral Daily   atorvastatin 10 mg Oral Nightly   carvedilol 12.5 mg Oral Q12H   cetirizine 10 mg Oral Daily   chlorthalidone 25 mg Oral Nightly   famotidine 20 mg Oral Nightly   fluticasone 2 spray Each Nare Daily   insulin lispro 0-7 Units Subcutaneous 4x Daily With Meals & Nightly   levothyroxine 50 mcg Oral Q AM   losartan 100 mg Oral Daily   magnesium citrate 296 mL Oral Once   pantoprazole 40 mg Oral Daily   potassium chloride 10 mEq Oral Daily   sertraline 100 mg Oral Daily   sucralfate 1 g Oral TID AC   zolpidem 10 mg Oral Nightly       Assessment/Plan     S/P reverse total shoulder arthroplasty, right    Right shoulder pain    HTN  (hypertension)    HLD (hyperlipidemia)    Prediabetes    Hypothyroid    Glenohumeral arthritis, right    Leukocytosis, likely reactive    Acute postoperative pain    GERD (gastroesophageal reflux disease)      Plan  1. PT/OT- AMANDO Self  2. Pain control-prns, interscalene block  3. IS-encouraged  4. DVT proph- [facility]  5. Bowel regimen  6. Monitor post-op labs  7. DC planning for rehab Monday. CM following     GERD  - Ttums, pepcid and carafate      HTN, HLD  - Continue home atenolol, statin, Coreg, Cozaar  - Monitor BP   - Labetalol PRN for SBP>170     PreDM  - hgb A1c on 3/18/19 5.9  - Accuchecks ACHS with low dose SSI     Hypothyroid  - Continue home Synthroid    Discussed with patient.     Bill Shen MD  19  12:38 PM         Electronically signed by Bill Shen MD at 3/31/2019 12:40 PM       Consult Notes (most recent note)     No notes of this type exist for this encounter.           Physical Therapy Notes (most recent note)      Ever See, PT at 3/31/2019  7:49 AM  Version 1 of 1         Acute Care - Physical Therapy Treatment Note   Kristina     Patient Name: Milagros Ortiz  : 1945  MRN: [id number]  Today's Date: 3/31/2019  Onset of Illness/Injury or Date of Surgery: 19  Date of Referral to PT: 19  Referring Physician: MD Bethany     Admit Date: 3/28/2019    Visit Dx:    ICD-10-CM ICD-9-CM   1. Impaired functional mobility, balance, gait, and endurance Z74.09 V49.89   2. Impaired mobility and ADLs Z74.09 799.89     Patient Active Problem List   Diagnosis   • Right shoulder pain   • HTN (hypertension)   • HLD (hyperlipidemia)   • Prediabetes   • Hypothyroid   • Glenohumeral arthritis, right   • S/P reverse total shoulder arthroplasty, right   • Leukocytosis, likely reactive   • Acute postoperative pain   • GERD (gastroesophageal reflux disease)       Therapy Treatment    Rehabilitation Treatment Summary     Row Name 19 0730             Treatment  Time/Intention    Discipline  physical therapist  -MJ      Document Type  therapy note (daily note)  -MJ      Subjective Information  complains of;pain  -MJ      Mode of Treatment  physical therapy  -MJ      Patient/Family Observations  Pt supine in bed  -MJ      Care Plan Review  patient/other agree to care plan  -MJ      Patient Effort  good  -MJ      Existing Precautions/Restrictions  fall;right;shoulder;non-weight bearing;other (see comments) sling to R UE; interscalene nerve catheter  -MJ      Recorded by [MJ] Ever See, PT 03/31/19 0747      Row Name 03/31/19 0730             Cognitive Assessment/Intervention- PT/OT    Affect/Mental Status (Cognitive)  WNL  -MJ      Orientation Status (Cognition)  oriented x 4  -MJ      Follows Commands (Cognition)  WNL  -MJ      Recorded by [MJ] Ever See, PT 03/31/19 0747      Row Name 03/31/19 0730             Safety Issues, Functional Mobility    Impairments Affecting Function (Mobility)  balance;pain;range of motion (ROM);strength  -MJ      Recorded by [MJ] Ever See, PT 03/31/19 0747      Row Name 03/31/19 0730             Mobility Assessment/Intervention    Extremity Weight-bearing Status  right upper extremity  -MJ      Right Upper Extremity (Weight-bearing Status)  non weight-bearing (NWB)  -MJ      Recorded by [MJ] Ever See, PT 03/31/19 0747      Row Name 03/31/19 0730             Bed Mobility Assessment/Treatment    Supine-Sit [place] (Bed Mobility)  minimum assist (75% patient effort);verbal cues  -MJ      Sit-Supine [place] (Bed Mobility)  not tested Pt UIC  -MJ      Bed Mobility, Safety Issues  decreased use of arms for pushing/pulling  -MJ      Assistive Device (Bed Mobility)  head of bed elevated  -MJ      Comment (Bed Mobility)  Verbal cues for sequencing, pt required UE support on L to bring trunk to upright sitting  -MJ      Recorded by [MJ] Ever See, PT 03/31/19 0747      Row Name 03/31/19 0730             Transfer  Assessment/Treatment    Transfer Assessment/Treatment  sit-stand transfer;stand-sit transfer  -MJ      Comment (Transfers)  Verbal cues for correct hand placement  -MJ      Recorded by [MJ] Ever See, PT 03/31/19 0747      Row Name 03/31/19 0730             Sit-Stand Transfer    Sit-Stand [place] (Transfers)  contact guard;verbal cues  -MJ      Assistive Device (Sit-Stand Transfers)  -- no AD  -MJ      Recorded by [MJ] Ever See, PT 03/31/19 0747      Row Name 03/31/19 0730             Stand-Sit Transfer    Stand-Sit [place] (Transfers)  contact guard;verbal cues  -MJ      Assistive Device (Stand-Sit Transfers)  -- no AD  -MJ      Recorded by [MJ] Ever See, PT 03/31/19 0747      Row Name 03/31/19 0730             Gait/Stairs Assessment/Training    02260 - Gait Training Minutes   12  -MJ      [place] Level (Gait)  contact guard;verbal cues  -MJ2      Assistive Device (Gait)  -- no AD  -MJ2      Distance in Feet (Gait)  210  -MJ2      Pattern (Gait)  step-through  -MJ2      Deviations/Abnormal Patterns (Gait)  bilateral deviations;sinai decreased;stride length decreased  -MJ2      Bilateral Gait Deviations  heel strike decreased  -MJ2      Comment (Gait/Stairs)  Pt demo step through gait pattern at slow pace. Verbal cues to allow heel to strike at initial contact and to watch R UE around doorways. Gait limited by fatigue  -MJ2      Recorded by [MJ] Ever See, PT 03/31/19 0749  [MJ2] Ever See, PT 03/31/19 0747      Row Name 03/31/19 0730             Therapeutic Exercise    68550 - PT Therapeutic Activity Minutes  2  -MJ      Recorded by [MJ] Ever See, PT 03/31/19 0749      Row Name 03/31/19 0730             Positioning and Restraints    Pre-Treatment Position  in bed  -MJ      Post Treatment Position  chair  -MJ      In Chair  notified nsg;sitting;call light within reach;encouraged to call for assist;with nsg  -MJ      Recorded by [MJ] Ever See, PT 03/31/19 0747      Row Name  03/31/19 0730             Pain Scale: Numbers Pre/Post-Treatment    Pain Scale: Numbers, Pretreatment  2/10  -MJ      Pain Scale: Numbers, Post-Treatment  2/10  -MJ      Pain Location - Side  Right  -MJ      Pain Location  shoulder  -MJ      Pain Intervention(s)  Repositioned;Ambulation/increased activity  -MJ      Recorded by [MJ] Ever See, PT 03/31/19 0747      Row Name                Wound 03/28/19 1317 Right shoulder incision    Wound - Properties Group Date first assessed: 03/28/19 [MR] Time first assessed: 1317 [MR] Side: Right [MR] Location: shoulder [MR] Type: incision [MR] Recorded by:  [MR] Angeles Keating RN 03/28/19 1317    Row Name 03/31/19 0730             Plan of Care Review    Plan of Care Reviewed With  patient  -MJ      Recorded by [MJ] Ever See, PT 03/31/19 0747      Row Name 03/31/19 0730             Outcome Summary/Treatment Plan (PT)    Daily Summary of Progress (PT)  progress toward functional goals is gradual  -MJ      Recorded by [MJ] Ever See, PT 03/31/19 0747        User Key  (r) = Recorded By, (t) = Taken By, (c) = Cosigned By    Initials Name Effective Dates Discipline     Ever See, PT 04/03/18 -  PT    Angeles Chamorro, RN 09/07/18 -  Nurse          Wound 03/28/19 1317 Right shoulder incision (Active)   Dressing Appearance dry;intact;no drainage 3/30/2019  7:34 PM   Drainage Amount none 3/30/2019  7:34 PM           Physical Therapy Education     Title: PT OT SLP Therapies (Done)     Topic: Physical Therapy (Done)     Point: Mobility training (Done)     Learning Progress Summary           Patient Acceptance, E,D, VU,NR by KUMAR at 3/31/2019  7:30 AM    Comment:  Reviewed gait mechanics, benefits of mobility    Acceptance, E,D, VU,NR by KUMAR at 3/30/2019  2:15 PM    Comment:  Reviewed gait mechanics, benefits of mobility, NWB R UE    Acceptance, E,D, VU,NR by RIVAS at 3/29/2019  9:41 AM    Comment:  Reviewed NWB status, benefits of activity, indications for use of SPC, progression of  POC.                   Point: Home exercise program (Done)     Learning Progress Summary           Patient Acceptance, E,D, VU,NR by  at 3/31/2019  7:30 AM    Comment:  Reviewed gait mechanics, benefits of mobility    Acceptance, E,D, VU,NR by  at 3/30/2019  2:15 PM    Comment:  Reviewed gait mechanics, benefits of mobility, NWB R UE    Acceptance, E,D, VU,NR by  at 3/29/2019  9:41 AM    Comment:  Reviewed NWB status, benefits of activity, indications for use of SPC, progression of POC.                   Point: Body mechanics (Done)     Learning Progress Summary           Patient Acceptance, E,D, VU,NR by  at 3/31/2019  7:30 AM    Comment:  Reviewed gait mechanics, benefits of mobility    Acceptance, E,D, VU,NR by  at 3/30/2019  2:15 PM    Comment:  Reviewed gait mechanics, benefits of mobility, NWB R UE    Acceptance, E,D, VU,NR by  at 3/29/2019  9:41 AM    Comment:  Reviewed NWB status, benefits of activity, indications for use of SPC, progression of POC.                   Point: Precautions (Done)     Learning Progress Summary           Patient Acceptance, E,D, VU,NR by  at 3/31/2019  7:30 AM    Comment:  Reviewed gait mechanics, benefits of mobility    Acceptance, E,D, VU,NR by  at 3/30/2019  2:15 PM    Comment:  Reviewed gait mechanics, benefits of mobility, NWB R UE    Acceptance, E,D, VU,NR by  at 3/29/2019  9:41 AM    Comment:  Reviewed NWB status, benefits of activity, indications for use of SPC, progression of POC.                               User Key     Initials Effective Dates Name Provider Type Discipline     04/03/18 -  Ever See, PT Physical Therapist PT     04/03/18 -  Lucretia Suazo PT Physical Therapist PT                PT Recommendation and Plan     Outcome Summary/Treatment Plan (PT)  Daily Summary of Progress (PT): progress toward functional goals is gradual  Plan of Care Reviewed With: patient  Progress: improving  Outcome Summary: Pt increased gait distance to  210 feet with CGA and no AD, limited by fatigue. Pt anticipates discharge to rehab tomorrow, will continue to progress mobility as able.   Outcome Measures     Row Name 03/31/19 0730 03/30/19 1448 03/30/19 1415       How much help from another person do you currently need...    Turning from your back to your side while in flat bed without using bedrails?  3  -MJ  --  3  -MJ    Moving from lying on back to sitting on the side of a flat bed without bedrails?  3  -MJ  --  3  -MJ    Moving to and from a bed to a chair (including a wheelchair)?  3  -MJ  --  3  -MJ    Standing up from a chair using your arms (e.g., wheelchair, bedside chair)?  3  -MJ  --  3  -MJ    Climbing 3-5 steps with a railing?  3  -MJ  --  3  -MJ    To walk in hospital room?  3  -MJ  --  3  -MJ    AM-PAC 6 Clicks Score  18  -MJ  --  18  -MJ       How much help from another is currently needed...    Putting on and taking off regular lower body clothing?  --  2  -AR  --    Bathing (including washing, rinsing, and drying)  --  2  -AR  --    Toileting (which includes using toilet bed pan or urinal)  --  2  -AR  --    Putting on and taking off regular upper body clothing  --  2  -AR  --    Taking care of personal grooming (such as brushing teeth)  --  3  -AR  --    Eating meals  --  3  -AR  --    Score  --  14  -AR  --       Functional Assessment    Outcome Measure Options  AM-PAC 6 Clicks Basic Mobility (PT)  -MJ  AM-PAC 6 Clicks Daily Activity (OT)  -AR  AM-PAC 6 Clicks Basic Mobility (PT)  -MJ    Row Name 03/29/19 0941             How much help from another person do you currently need...    Turning from your back to your side while in flat bed without using bedrails?  3  -LM      Moving from lying on back to sitting on the side of a flat bed without bedrails?  3  -LM      Moving to and from a bed to a chair (including a wheelchair)?  3  -LM      Standing up from a chair using your arms (e.g., wheelchair, bedside chair)?  3  -LM      Climbing 3-5  steps with a railing?  3  -LM      To walk in hospital room?  3  -LM      AM-PAC 6 Clicks Score  18  -LM         Functional Assessment    Outcome Measure Options  AM-PAC 6 Clicks Basic Mobility (PT)  -LM        User Key  (r) = Recorded By, (t) = Taken By, (c) = Cosigned By    Initials Name Provider Type    Julia Lenz, OT Occupational Therapist    Ever Avelar, PT Physical Therapist    LM Lucretia Suazo, PT Physical Therapist         Time Calculation:   PT Charges     Row Name 19 0730             Time Calculation    Start Time  0730  -MJ      PT Received On  19  -MJ      PT Goal Re-Cert Due Date  19  -MJ         Time Calculation- PT    Total Timed Code Minutes- PT  14 minute(s)  -MJ         Timed Charges    10312 - Gait Training Minutes   12  -MJ      63371 - PT Therapeutic Activity Minutes  2  -MJ        User Key  (r) = Recorded By, (t) = Taken By, (c) = Cosigned By    Initials Name Provider Type    Ever Avelar, PT Physical Therapist        Therapy Charges for Today     Code Description Service Date Service Provider Modifiers Qty    81801933017 HC GAIT TRAINING EA 15 MIN 3/30/2019 Ever See, PT GP 1    64565769432 HC GAIT TRAINING EA 15 MIN 3/31/2019 Ever See, PT GP 1          PT G-Codes  Outcome Measure Options: AM-PAC 6 Clicks Basic Mobility (PT)  AM-PAC 6 Clicks Score: 18  Score: 14    Ever See PT  3/31/2019         Electronically signed by Ever See, PT at 3/31/2019  7:49 AM          Occupational Therapy Notes (most recent note)      Julia Bose, OT at 3/31/2019  2:54 PM          Acute Care - Occupational Therapy Treatment Note  [facility]     Patient Name: Milagros Ortiz  : 1945  MRN: [id number]  Today's Date: 3/31/2019  Onset of Illness/Injury or Date of Surgery: 19  Date of Referral to OT: 29  Referring Physician: MD Bethany     Admit Date: 3/28/2019       ICD-10-CM ICD-9-CM   1. Impaired functional mobility, balance,  gait, and endurance Z74.09 V49.89   2. Impaired mobility and ADLs Z74.09 799.89     Patient Active Problem List   Diagnosis   • Right shoulder pain   • HTN (hypertension)   • HLD (hyperlipidemia)   • Prediabetes   • Hypothyroid   • Glenohumeral arthritis, right   • S/P reverse total shoulder arthroplasty, right   • Leukocytosis, likely reactive   • Acute postoperative pain   • GERD (gastroesophageal reflux disease)     Past Medical History:   Diagnosis Date   • Arthritis    • Coronary artery disease    • Disease of thyroid gland    • GERD (gastroesophageal reflux disease)    • History of gastric ulcer    • Hyperlipidemia     STATES NEVER HAD HIGH CHOLESTEROL, GIVEN AFTER MI AS PREVENTION   • Hypertension    • MI (myocardial infarction) (CMS/[facility]) 09/15/2017   • Panic attacks    • Wears reading eyeglasses      Past Surgical History:   Procedure Laterality Date   • BUNIONECTOMY     • CARDIAC CATHETERIZATION      STENT PLACEMENT X1-2017   • COLONOSCOPY     • ENDOSCOPY     • HERNIA REPAIR      X2   • HYSTERECTOMY     • KNEE ARTHROPLASTY Bilateral        Therapy Treatment    Rehabilitation Treatment Summary     Row Name 03/31/19 1049 03/31/19 0730          Treatment Time/Intention    Discipline  occupational therapist  -AR  physical therapist  -MJ     Document Type  therapy note (daily note)  -AR  therapy note (daily note)  -MJ     Subjective Information  complains of;pain  -AR  complains of;pain  -MJ     Mode of Treatment  occupational therapy  -AR  physical therapy  -MJ     Patient/Family Observations  pt supine, no family at bedside  -AR  Pt supine in bed  -MJ     Care Plan Review  --  patient/other agree to care plan  -MJ     Patient Effort  excellent  -AR  good  -MJ     Existing Precautions/Restrictions  fall;right;shoulder;non-weight bearing [place] Ultra II sling, interscalene nerve catheter  -AR  fall;right;shoulder;non-weight bearing;other (see comments) sling to R UE; interscalene nerve catheter  -MJ      Recorded by [AR] Julia Bose, OT 03/31/19 1447 [MJ] Ever See, PT 03/31/19 0747     Row Name 03/31/19 1049 03/31/19 0730          Cognitive Assessment/Intervention- PT/OT    Affect/Mental Status (Cognitive)  WNL  -AR  WNL  -MJ     Orientation Status (Cognition)  oriented x 4  -AR  oriented x 4  -MJ     Follows Commands (Cognition)  WNL  -AR  WNL  -MJ     Cognitive Function (Cognitive)  safety deficit  -AR  --     Safety Deficit (Cognitive)  mild deficit;safety precautions awareness;safety precautions follow-through/compliance  -AR  --     Personal Safety Interventions  fall prevention program maintained  -AR  --     Recorded by [AR] Julia Bose, OT 03/31/19 1447 [MJ] Ever See, PT 03/31/19 0747     Row Name 03/31/19 1049 03/31/19 0730          Safety Issues, Functional Mobility    Safety Issues Affecting Function (Mobility)  safety precaution awareness;safety precautions follow-through/compliance  -AR  --     Impairments Affecting Function (Mobility)  balance;pain;endurance/activity tolerance  -AR  balance;pain;range of motion (ROM);strength  -MJ     Recorded by [AR] Julia Bose, OT 03/31/19 1447 [MJ] Ever See, PT 03/31/19 0747     Row Name 03/31/19 1049 03/31/19 0730          Mobility Assessment/Intervention    Extremity Weight-bearing Status  right upper extremity  -AR  right upper extremity  -MJ     Right Upper Extremity (Weight-bearing Status)  non weight-bearing (NWB)  -AR  non weight-bearing (NWB)  -MJ     Recorded by [AR] Julia Bose, OT 03/31/19 1447 [MJ] Ever See, PT 03/31/19 0747     Row Name 03/31/19 1049 03/31/19 0730          Bed Mobility Assessment/Treatment    Bed Mobility Assessment/Treatment  scooting/bridging;supine-sit;sit-supine;rolling left;rolling right  -AR  --     Rolling Left [place] (Bed Mobility)  supervision  -AR  --     Rolling Right [place] (Bed Mobility)  supervision  -AR  --     Scooting/Bridging [place] (Bed Mobility)   supervision  -AR  --     Supine-Sit [place] (Bed Mobility)  minimum assist (75% patient effort);verbal cues  -AR  minimum assist (75% patient effort);verbal cues  -MJ     Sit-Supine [place] (Bed Mobility)  contact guard;verbal cues  -AR  not tested Pt UIC  -MJ     Bed Mobility, Safety Issues  decreased use of arms for pushing/pulling  -AR  decreased use of arms for pushing/pulling  -MJ     Assistive Device (Bed Mobility)  bed rails;head of bed elevated  -AR  head of bed elevated  -MJ     Comment (Bed Mobility)  Good ability to maintain NWB RUE  -AR  Verbal cues for sequencing, pt required UE support on L to bring trunk to upright sitting  -MJ     Recorded by [AR] Julia Bose, OT 03/31/19 1447 [MJ] Ever See, PT 03/31/19 0744     Row Name 03/31/19 1049             Functional Mobility    Functional Mobility- Ind. Level  contact guard assist  -AR      Functional Mobility-Distance (Feet)  150  -AR      Functional Mobility- Comment  Post toileting, pt requesting ambulate in hallway. One minor LOB instance noted. Pt with mild dyspnea, oxygen saturation on RA at end of ambulation 92%.   -AR      Recorded by [AR] Julia Bose, OT 03/31/19 1447      Row Name 03/31/19 1049 03/31/19 3701          Transfer Assessment/Treatment    Transfer Assessment/Treatment  sit-stand transfer;stand-sit transfer;bed-chair transfer;chair-bed transfer;toilet transfer  -AR  sit-stand transfer;stand-sit transfer  -MJ     Comment (Transfers)  Cues for chair approach and to attend to location of ARROW pump to avoid dislodgement. Pt transitioned to chair for completion of HEP then assisted back to bed per pt request.   -AR  Verbal cues for correct hand placement  -MJ     Recorded by [AR] Julia Bose, OT 03/31/19 1447 [MJ] Ever See, PT 03/31/19 3065     Row Name 03/31/19 1046             Bed-Chair Transfer    Bed-Chair [place] (Transfers)  contact guard;verbal cues  -AR      Recorded by [AR] Julia Bose  [place], OT 03/31/19 1447      Row Name 03/31/19 1049             Chair-Bed Transfer    Chair-Bed [place] (Transfers)  contact guard;verbal cues  -AR      Recorded by [AR] Lidya Julia Sabine, OT 03/31/19 1447      Row Name 03/31/19 1049 03/31/19 0730          Sit-Stand Transfer    Sit-Stand [place] (Transfers)  contact guard;verbal cues  -AR  contact guard;verbal cues  -MJ     Assistive Device (Sit-Stand Transfers)  --  -- no AD  -MJ     Recorded by [AR] Julia Bose, OT 03/31/19 1447 [MJ] Ever See, PT 03/31/19 0747     Row Name 03/31/19 1049 03/31/19 0730          Stand-Sit Transfer    Stand-Sit [place] (Transfers)  contact guard;verbal cues  -AR  contact guard;verbal cues  -MJ     Assistive Device (Stand-Sit Transfers)  --  -- no AD  -MJ     Recorded by [AR] Julia Bose, OT 03/31/19 1447 [MJ] Ever See, PT 03/31/19 0747     Row Name 03/31/19 1049             Toilet Transfer    Type (Toilet Transfer)  sit-stand;stand-sit  -AR      [place] Level (Toilet Transfer)  supervision;verbal cues  -AR      Assistive Device (Toilet Transfer)  grab bars/safety frame;raised toilet seat  -AR      Recorded by [AR] Julia Bose, OT 03/31/19 1447      Row Name 03/31/19 0730             Gait/Stairs Assessment/Training    65269 - Gait Training Minutes   12  -MJ      [place] Level (Gait)  contact guard;verbal cues  -MJ2      Assistive Device (Gait)  -- no AD  -MJ2      Distance in Feet (Gait)  210  -MJ2      Pattern (Gait)  step-through  -MJ2      Deviations/Abnormal Patterns (Gait)  bilateral deviations;sinai decreased;stride length decreased  -MJ2      Bilateral Gait Deviations  heel strike decreased  -MJ2      Comment (Gait/Stairs)  Pt demo step through gait pattern at slow pace. Verbal cues to allow heel to strike at initial contact and to watch R UE around doorways. Gait limited by fatigue  -MJ2      Recorded by [MJ] Ever See, PT 03/31/19 0749  [MJ2] Ever See, PT  03/31/19 0747      Row Name 03/31/19 1049             ADL Assessment/Intervention    07774 - OT Self Care/Mgmt Minutes  29  -AR      BADL Assessment/Intervention  bathing;upper body dressing;grooming;toileting;feeding  -AR2      Recorded by [AR] Julia Bose, OT 03/31/19 1453  [AR2] Julia Bose, OT 03/31/19 1452      Row Name 03/31/19 1049             Bathing Assessment/Intervention    Comment (Bathing)  Pt had completed UBB prior to OT arrival with tech. Pt unable to recall axilla care. OT reviewed all precautions and right axilla care to maintain, reviewed that pt may shower once interscalene has been DC. Reviewed positioning of RUE while showering.   -AR      Recorded by [AR] Julia Bose, OT 03/31/19 1447      Row Name 03/31/19 1049             Upper Body Dressing Assessment/Training    Upper Body Dressing [place] Level  doff;don;pull-over garment;maximum assist (25% patient effort);other (see comments) RUE sling  -AR      Assistive Devices (Upper Body Dressing)  one hand technique  -AR      Upper Body Dressing Position  supported sitting  -AR      Comment (Upper Body Dressing)  OT reviewed right shoulder precautions, ADL retraining to maintain, care of interescalene nerve catheter during ADLs and sling management including application/wear schedule and proper fit. Pt required max assist and cues for correct application of sling.   -AR      Recorded by [AR] Julia Bose OT 03/31/19 1447      Row Name 03/31/19 1049             Grooming Assessment/Training    [place] Level (Grooming)  wash face, hands;supervision  -AR      Grooming Position  sink side  -AR      Recorded by [AR] Julia Bose OT 03/31/19 1452      Row Name 03/31/19 1049             Self-Feeding Assessment/Training    [place] Level (Feeding)  liquids to mouth;supervision  -AR      Position (Self-Feeding)  supine  -AR      Recorded by [AR] Julia Bose OT 03/31/19 1452      Row Name 03/31/19  1049             Toileting Assessment/Training    [place] Level (Toileting)  adjust/manage clothing;moderate assist (50% patient effort);perform perineal hygiene;contact guard assist  -AR      Assistive Devices (Toileting)  commode, 3-in-1  -AR      Toileting Position  unsupported sitting;unsupported standing  -AR      Recorded by [AR] Julia Bose, OT 03/31/19 1452      Row Name 03/31/19 1049             BADL Safety/Performance    Impairments, BADL Safety/Performance  balance;pain;range of motion;endurance/activity tolerance;strength;shortness of breath  -AR      Skilled BADL Treatment/Intervention  BADL process/adaptation training;compensatory training;energy conservation;segundo/one-hand technique  -AR      Recorded by [AR] Julia Bose, OT 03/31/19 1447      Row Name 03/31/19 1049 03/31/19 0730          Therapeutic Exercise    37798 - PT Therapeutic Activity Minutes  --  2  -MJ     60251 - OT Therapeutic Exercise Minutes  9  -AR  --     Recorded by [AR] Julia Bose, OT 03/31/19 1447 [MJ] Ever See, PT 03/31/19 0749     Row Name 03/31/19 1049             Therapeutic Exercise    Upper Extremity Range of Motion (Therapeutic Exercise)  elbow flexion/extension, left;forearm supination/pronation, left;wrist flexion/extension, left  -AR      Hand (Therapeutic Exercise)  finger flexion/extension, left  -AR      Exercise Type (Therapeutic Exercise)  AROM (active range of motion);AAROM (active assistive range of motion) initial AAROM required for elbow, progressed to AROM  -AR      Position (Therapeutic Exercise)  seated  -AR      Sets/Reps (Therapeutic Exercise)  1/10  -AR      Recorded by [AR] Julia Bose, OT 03/31/19 1447      Row Name 03/31/19 1049             Static Sitting Balance    Level of [place] (Unsupported Sitting, Static Balance)  independent  -AR      Sitting Position (Unsupported Sitting, Static Balance)  sitting on edge of bed  -AR      Time Able to Maintain  Position (Unsupported Sitting, Static Balance)  more than 5 minutes  -AR      Recorded by [AR] Julia Bose, OT 03/31/19 1447      Row Name 03/31/19 1049             Static Standing Balance    Level of [place] (Supported Standing, Static Balance)  supervision  -AR      Time Able to Maintain Position (Supported Standing, Static Balance)  30 to 45 seconds  -AR      Recorded by [AR] Julia Bose, OT 03/31/19 1447      Row Name 03/31/19 1049 03/31/19 0730          Positioning and Restraints    Pre-Treatment Position  in bed  -AR  in bed  -MJ     Post Treatment Position  bed  -AR  chair  -MJ     In Bed  supine;call light within reach;encouraged to call for assist;exit alarm on;SCD pump applied;with brace  -AR  --     In Chair  --  notified nsg;sitting;call light within reach;encouraged to call for assist;with nsg  -MJ     Recorded by [AR] Julia Bose, OT 03/31/19 1447 [MJ] Ever See, PT 03/31/19 0747     Row Name 03/31/19 1049 03/31/19 0730          Pain Scale: Numbers Pre/Post-Treatment    Pain Scale: Numbers, Pretreatment  5/10  -AR  2/10  -MJ     Pain Scale: Numbers, Post-Treatment  6/10  -AR  2/10  -MJ     Pain Location - Side  Right  -AR  Right  -MJ     Pain Location - Orientation  generalized  -AR  --     Pain Location  shoulder  -AR  shoulder  -MJ     Pain Intervention(s)  Medication (See MAR);Cold applied;Repositioned;Ambulation/increased activity  -AR  Repositioned;Ambulation/increased activity  -MJ     Recorded by [AR] Julia Bose, OT 03/31/19 1447 [MJ] Ever See, PT 03/31/19 0747     Row Name 03/31/19 1049             Orthotic/Prosthetic Management    Orthosis Location  upper extremity orthosis  -AR      Recorded by [AR] Julia Bose, OT 03/31/19 1447      Row Name 03/31/19 1049             Upper Extremity Orthosis Management    Type (Upper Extremity Orthosis)  [place] Ultra II sling  -AR      Functional Design (Upper Extremity Orthosis)  static orthosis  -AR       Therapeutic Indications (Upper Extremity Orthosis)  post-op positioning/protection;stabilization and support  -AR      Wearing Schedule (Upper Extremity Orthosis)  remove for exercise;remove for hygiene/bathing  -AR      Orthosis Training (Upper Extremity Orthosis)  patient;activity limitations/precautions;donning/doffing orthosis;orthosis adjustment;orthosis maintenance;purpose/goals of orthosis;sensory precautions;skin inspection/precautions;sleeping precautions;wearing schedule;requires cues;requires assistance  -AR      Compliance/Wearing Issues (Upper Extremity Orthosis)  patient/caregiver comprehend strategies  -AR      Recorded by [AR] Julia Bose, OT 03/31/19 1447      Row Name                Wound 03/28/19 1317 Right shoulder incision    Wound - Properties Group Date first assessed: 03/28/19 [MR] Time first assessed: 1317 [MR] Side: Right [MR] Location: shoulder [MR] Type: incision [MR] Recorded by:  [MR] Angeles Keating RN 03/28/19 1317    Row Name 03/31/19 1049 03/31/19 0730          Plan of Care Review    Plan of Care Reviewed With  patient  -AR  patient  -MJ     Recorded by [AR] Julia Bose, OT 03/31/19 1447 [MJ] Ever See, PT 03/31/19 0747     Row Name 03/31/19 1049             Outcome Summary/Treatment Plan (OT)    Daily Summary of Progress (OT)  progress toward functional goals as expected  -AR      Anticipated Discharge Disposition (OT)  skilled nursing facility  -AR      Recorded by [AR] Julia Bose, OT 03/31/19 1447      Row Name 03/31/19 0730             Outcome Summary/Treatment Plan (PT)    Daily Summary of Progress (PT)  progress toward functional goals is gradual  -MJ      Recorded by [MJ] Ever See, PT 03/31/19 0747        User Key  (r) = Recorded By, (t) = Taken By, (c) = Cosigned By    Initials Name Effective Dates Discipline    AR Julia Bose, OT 06/22/15 -  OT    Ever Avelar, PT 04/03/18 -  PT    Angeles Chamorro RN 09/07/18 -  Nurse        Wound  03/28/19 1317 Right shoulder incision (Active)   Dressing Appearance dry;intact;no drainage 3/31/2019  7:40 AM   Closure CECY 3/31/2019  7:40 AM   Base dressing in place, unable to visualize 3/31/2019  7:40 AM   Drainage Amount none 3/31/2019  7:40 AM     Rehab Goal Summary     Row Name 03/31/19 1049             Bed Mobility Goal 1 (OT)    Progress/Outcomes (Bed Mobility Goal 1, OT)  goal partially met;goal ongoing  -AR         Transfer Goal 1 (OT)    Progress/Outcome (Transfer Goal 1, OT)  goal ongoing  -AR         Dressing Goal 1 (OT)    Progress/Outcome (Dressing Goal 1, OT)  goal ongoing  -AR         Precaution Goal 1 (OT)    Progress/Outcome (Precaution Goal 1, OT)  goal ongoing  -AR         Problem Specific Goal 1 (OT)    Progress/Outcome (Problem Specific Goal 1, OT)  goal ongoing  -AR        User Key  (r) = Recorded By, (t) = Taken By, (c) = Cosigned By    Initials Name Provider Type Discipline    Julia Lenz, OT Occupational Therapist OT        Occupational Therapy Education     Title: PT OT SLP Therapies (Done)     Topic: Occupational Therapy (Done)     Point: ADL training (Done)     Description: Instruct learner(s) on proper safety adaptation and remediation techniques during self care or transfers.   Instruct in proper use of assistive devices.    Learning Progress Summary           Patient Eager, E,TB,D, VU,NR by AR at 3/31/2019 10:49 AM    Acceptance, E,D,TB, VU,NR by AR at 3/30/2019  2:48 PM    Acceptance, E, VU,NR by  at 3/29/2019  1:21 PM    Acceptance, E, VU,NR by HK at 3/29/2019  1:21 PM    Comment:  Pt educated on sling management, shoulder precautions, axilla care, segundo dressing, and care of nerve catheter during ADLS. Educated on R UE HEP, appropriate body mechanics and safety precautions.                   Point: Home exercise program (Done)     Description: Instruct learner(s) on appropriate technique for monitoring, assisting and/or progressing therapeutic exercises/activities.     Learning Progress Summary           Patient Eager, E,TB,D, VU,NR by AR at 3/31/2019 10:49 AM    Acceptance, E,D,TB, VU,NR by AR at 3/30/2019  2:48 PM    Acceptance, E, VU,NR by HK at 3/29/2019  1:21 PM    Acceptance, E, VU,NR by HK at 3/29/2019  1:21 PM    Comment:  Pt educated on sling management, shoulder precautions, axilla care, segundo dressing, and care of nerve catheter during ADLS. Educated on R UE HEP, appropriate body mechanics and safety precautions.                   Point: Precautions (Done)     Description: Instruct learner(s) on prescribed precautions during self-care and functional transfers.    Learning Progress Summary           Patient Eager, E,TB,D, VU,NR by AR at 3/31/2019 10:49 AM    Acceptance, E,D,TB, VU,NR by AR at 3/30/2019  2:48 PM    Acceptance, E, VU,NR by HK at 3/29/2019  1:21 PM    Acceptance, E, VU,NR by HK at 3/29/2019  1:21 PM    Comment:  Pt educated on sling management, shoulder precautions, axilla care, segundo dressing, and care of nerve catheter during ADLS. Educated on R UE HEP, appropriate body mechanics and safety precautions.                   Point: Body mechanics (Done)     Description: Instruct learner(s) on proper positioning and spine alignment during self-care, functional mobility activities and/or exercises.    Learning Progress Summary           Patient Eager, E,TB,D, VU,NR by AR at 3/31/2019 10:49 AM    Acceptance, E,D,TB, VU,NR by AR at 3/30/2019  2:48 PM    Acceptance, E, VU,NR by HK at 3/29/2019  1:21 PM    Acceptance, E, VU,NR by HK at 3/29/2019  1:21 PM    Comment:  Pt educated on sling management, shoulder precautions, axilla care, segundo dressing, and care of nerve catheter during ADLS. Educated on R UE HEP, appropriate body mechanics and safety precautions.                               User Key     Initials Effective Dates Name Provider Type Discipline    AR 06/22/15 -  Julia Bose OT Occupational Therapist OT     03/07/18 -  Aleah Moore OT  Occupational Therapist OT                OT Recommendation and Plan  Outcome Summary/Treatment Plan (OT)  Daily Summary of Progress (OT): progress toward functional goals as expected  Anticipated Discharge Disposition (OT): skilled nursing facility  Daily Summary of Progress (OT): progress toward functional goals as expected  Plan of Care Review  Plan of Care Reviewed With: patient  Plan of Care Reviewed With: patient  Outcome Summary: Interscalene infusuing at 6,post pain level 6/10 generalized left shoulder region. She completed RUE HEP with supervision, bed mobility with min/CGA, transfer training with CGA. Pt with decreased carry-over of shoulder precautions and ADL retraining to maintain, OT to continue with reinforcement. Recommend SNF-level rehab.   Outcome Measures     Row Name 03/31/19 1049 03/31/19 0730 03/30/19 1448       How much help from another person do you currently need...    Turning from your back to your side while in flat bed without using bedrails?  --  3  -MJ  --    Moving from lying on back to sitting on the side of a flat bed without bedrails?  --  3  -MJ  --    Moving to and from a bed to a chair (including a wheelchair)?  --  3  -MJ  --    Standing up from a chair using your arms (e.g., wheelchair, bedside chair)?  --  3  -MJ  --    Climbing 3-5 steps with a railing?  --  3  -MJ  --    To walk in hospital room?  --  3  -MJ  --    AM-PAC 6 Clicks Score  --  18  -MJ  --       How much help from another is currently needed...    Putting on and taking off regular lower body clothing?  2  -AR  --  2  -AR    Bathing (including washing, rinsing, and drying)  2  -AR  --  2  -AR    Toileting (which includes using toilet bed pan or urinal)  3  -AR  --  2  -AR    Putting on and taking off regular upper body clothing  2  -AR  --  2  -AR    Taking care of personal grooming (such as brushing teeth)  3  -AR  --  3  -AR    Eating meals  3  -AR  --  3  -AR    Score  15  -AR  --  14  -AR       Functional  Assessment    Outcome Measure Options  --  AM-PAC 6 Clicks Basic Mobility (PT)  -MJ  AM-PAC 6 Clicks Daily Activity (OT)  -AR    Row Name 03/30/19 1415 03/29/19 0941          How much help from another person do you currently need...    Turning from your back to your side while in flat bed without using bedrails?  3  -MJ  3  -LM     Moving from lying on back to sitting on the side of a flat bed without bedrails?  3  -MJ  3  -LM     Moving to and from a bed to a chair (including a wheelchair)?  3  -MJ  3  -LM     Standing up from a chair using your arms (e.g., wheelchair, bedside chair)?  3  -MJ  3  -LM     Climbing 3-5 steps with a railing?  3  -MJ  3  -LM     To walk in hospital room?  3  -MJ  3  -LM     AM-PAC 6 Clicks Score  18  -MJ  18  -LM        Functional Assessment    Outcome Measure Options  AM-PAC 6 Clicks Basic Mobility (PT)  -MJ  AM-PAC 6 Clicks Basic Mobility (PT)  -LM       User Key  (r) = Recorded By, (t) = Taken By, (c) = Cosigned By    Initials Name Provider Type    Julia Lenz, OT Occupational Therapist    Ever Avelar, PT Physical Therapist    LM Lucretia Suazo, PT Physical Therapist           Time Calculation:   Time Calculation- OT     Row Name 03/31/19 1049 03/31/19 0730          Time Calculation- OT    OT Start Time  1049  -AR  --     Total Timed Code Minutes- OT  38 minute(s)  -AR  --     OT Received On  03/31/19  -AR  --     OT Goal Re-Cert Due Date  04/08/19  -AR  --        Timed Charges    91302 - OT Therapeutic Exercise Minutes  9  -AR  --     13607 - Gait Training Minutes   --  12  -MJ     41227 - OT Self Care/Mgmt Minutes  29  -AR  --       User Key  (r) = Recorded By, (t) = Taken By, (c) = Cosigned By    Initials Name Provider Type    Julia Lenz, OT Occupational Therapist    Ever Avelar, PT Physical Therapist        Therapy Charges for Today     Code Description Service Date Service Provider Modifiers Qty    04899246931 HC OT SELF CARE/MGMT/TRAIN EA 15  MIN 3/30/2019 Julia Bose OT GO 1    76410586910 HC OT THER PROC EA 15 MIN 3/31/2019 Julia Bose OT GO 1    87339244364 HC OT SELF CARE/MGMT/TRAIN EA 15 MIN 3/31/2019 Julia Bose OT GO 2               Julia Bose OT  3/31/2019    Electronically signed by Julia Bose OT at 3/31/2019  2:54 PM

## 2019-04-01 NOTE — THERAPY DISCHARGE NOTE
Acute Care - Occupational Therapy Treatment Note/Discharge   [place]     Patient Name: Milagros Ortiz  : 1945  MRN: [id number]  Today's Date: 2019  Onset of Illness/Injury or Date of Surgery: 19  Date of Referral to OT: 29  Referring Physician: MD Bethany       Admit Date: 3/28/2019    Visit Dx:     ICD-10-CM ICD-9-CM   1. Impaired functional mobility, balance, gait, and endurance Z74.09 V49.89   2. Impaired mobility and ADLs Z74.09 799.89     Patient Active Problem List   Diagnosis   • Right shoulder pain   • HTN (hypertension)   • HLD (hyperlipidemia)   • Prediabetes   • Hypothyroid   • Glenohumeral arthritis, right   • S/P reverse total shoulder arthroplasty, right   • Leukocytosis, likely reactive   • Acute postoperative pain   • GERD (gastroesophageal reflux disease)       Therapy Treatment    Rehabilitation Treatment Summary     Row Name 19 1143 19 0847          Treatment Time/Intention    Discipline  occupational therapist  -AR  physical therapist  -LM     Document Type  therapy note (daily note);discharge treatment  -AR  therapy note (daily note)  -LM     Subjective Information  no complaints  -AR  no complaints reports vomiting earlier this AM  -LM     Mode of Treatment  occupational therapy  -AR  physical therapy;individual therapy  -LM     Patient/Family Observations  --  Pt received sitting UIC, brace to RUE.  -LM     Care Plan Review  --  care plan/treatment goals reviewed;patient/other agree to care plan  -LM     Therapy Frequency (PT Clinical Impression)  --  daily  -LM     Patient Effort  excellent  -AR  excellent  -LM     Existing Precautions/Restrictions  fall;right;shoulder;non-weight bearing;other (see comments) Kishore Ultra II sling, intgerscalene has been DC  -AR  fall;right;shoulder;non-weight bearing brace to RUE, NWB RUE  -LM     Recorded by [AR] Julia Bose, OT 19 2553 [LM] Lucretia Suazo, PT 19 1027     Row Name 19  0847             Vital Signs    O2 Delivery Pre Treatment  room air  -LM      O2 Delivery Intra Treatment  room air  -LM      O2 Delivery Post Treatment  room air  -LM      Pre Patient Position  Sitting  -LM      Intra Patient Position  Standing  -LM      Post Patient Position  Sitting  -LM      Recorded by [LM] Lucretia Suazo, PT 04/01/19 1027      Row Name 04/01/19 1143 04/01/19 0847          Cognitive Assessment/Intervention- PT/OT    Affect/Mental Status (Cognitive)  WNL  -AR  WNL  -LM     Orientation Status (Cognition)  oriented x 4  -AR  oriented x 4  -LM     Follows Commands (Cognition)  WNL  -AR  WNL  -LM     Cognitive Function (Cognitive)  safety deficit  -AR  safety deficit  -LM     Safety Deficit (Cognitive)  mild deficit  -AR  mild deficit;safety precautions awareness;safety precautions follow-through/compliance  -LM     Personal Safety Interventions  fall prevention program maintained  -AR  --     Recorded by [AR] Julia Bose, OT 04/01/19 1733 [LM] Lucretia Suazo, PT 04/01/19 1027     Row Name 04/01/19 1143 04/01/19 0847          Safety Issues, Functional Mobility    Safety Issues Affecting Function (Mobility)  safety precaution awareness;safety precautions follow-through/compliance  -AR  safety precaution awareness;safety precautions follow-through/compliance  -LM     Impairments Affecting Function (Mobility)  balance;other (see comments) R shoulder preautions  -AR  balance;strength;pain  -LM     Recorded by [AR] Julia Bose, OT 04/01/19 1733 [LM] Lucretia Suazo, PT 04/01/19 1027     Row Name 04/01/19 1143 04/01/19 0847          Mobility Assessment/Intervention    Extremity Weight-bearing Status  right upper extremity  -AR  right upper extremity  -LM     Right Upper Extremity (Weight-bearing Status)  non weight-bearing (NWB)  -AR  non weight-bearing (NWB)  -LM     Recorded by [AR] Julia Bose, OT 04/01/19 1733 [LM] Lucretia Suazo, PT 04/01/19 1027     Row Name 04/01/19  1143 04/01/19 0847          Bed Mobility Assessment/Treatment    Scooting/Bridging [place] (Bed Mobility)  supervision  -AR  --     Supine-Sit [place] (Bed Mobility)  minimum assist (75% patient effort);verbal cues  -AR  --     Bed Mobility, Safety Issues  decreased use of arms for pushing/pulling  -AR  --     Comment (Bed Mobility)  --  Not tested - pt received and left sitting UIC  -LM     Recorded by [AR] Julia Bose, OT 04/01/19 1733 [LM] Lucretia Suazo, PT 04/01/19 1027     Row Name 04/01/19 1143             Functional Mobility    Functional Mobility- Ind. Level  supervision required  -AR      Functional Mobility-Distance (Feet)  20  -AR      Recorded by [AR] Julia Bose, OT 04/01/19 1733      Row Name 04/01/19 1143 04/01/19 0847          Transfer Assessment/Treatment    Transfer Assessment/Treatment  sit-stand transfer;stand-sit transfer;toilet transfer  -AR  sit-stand transfer;stand-sit transfer  -LM     Comment (Transfers)  --  Cues for safety and to slowly approach chair and reach for armrest with LUE to slow descent into sitting.  -LM     Recorded by [AR] Julia Bose, OT 04/01/19 1733 [LM] Lucretia Suazo, PT 04/01/19 1027     Row Name 04/01/19 1143 04/01/19 0847          Sit-Stand Transfer    Sit-Stand [place] (Transfers)  supervision  -AR  contact guard;verbal cues  -LM     Assistive Device (Sit-Stand Transfers)  --  other (see comments) none  -LM     Recorded by [AR] Julia Bose, OT 04/01/19 1733 [LM] Lucretia Suazo, PT 04/01/19 1027     Row Name 04/01/19 1143 04/01/19 0847          Stand-Sit Transfer    Stand-Sit [place] (Transfers)  supervision  -AR  contact guard;verbal cues  -LM     Assistive Device (Stand-Sit Transfers)  --  other (see comments) none  -LM     Recorded by [AR] Julia Bose, OT 04/01/19 1733 [LM] Lucretia Suazo, PT 04/01/19 1027     Row Name 04/01/19 1143             Toilet Transfer    Type (Toilet Transfer)   sit-stand;stand-sit  -AR      [place] Level (Toilet Transfer)  supervision  -AR      Assistive Device (Toilet Transfer)  grab bars/safety frame;raised toilet seat  -AR      Recorded by [AR] Julia Bose OT 04/01/19 1733      Row Name 04/01/19 0847             Gait/Stairs Assessment/Training    71990 - Gait Training Minutes   9  -LM      [place] Level (Gait)  contact guard;verbal cues  -LM      Assistive Device (Gait)  other (see comments) none  -LM      Distance in Feet (Gait)  250  -LM      Pattern (Gait)  step-through  -LM      Deviations/Abnormal Patterns (Gait)  bilateral deviations;sinai decreased;stride length decreased  -LM      Bilateral Gait Deviations  heel strike decreased  -LM      Comment (Gait/Stairs)  Pt ambulated with step-through pattern at slow pace, cues for upright posture, increase step length, heel strike, and widen base of support. Cues for scanning environment to avoid obstacles with RUE. Gait distance limited by fatigue.  -LM      Recorded by [LM] Lucretia Suazo, PT 04/01/19 1027      Row Name 04/01/19 1143             ADL Assessment/Intervention    82609 - OT Self Care/Mgmt Minutes  33  -AR      BADL Assessment/Intervention  bathing;upper body dressing;lower body dressing;grooming;toileting;feeding  -AR      Recorded by [AR] Julia Bose OT 04/01/19 5483      Row Name 04/01/19 1143             Bathing Assessment/Intervention    Bathing [place] Level  bathing skills;upper body;maximum assist (25% patient effort);perineal area;contact guard assist  -AR      Bathing Position  unsupported sitting;unsupported standing  -AR      Comment (Bathing)  Pt able to recall right axilla care this date with min cues  -AR      Recorded by [AR] Julia Bose OT 04/01/19 1733      Row Name 04/01/19 1143             Upper Body Dressing Assessment/Training    Upper Body Dressing [place] Level  doff;pajama/robe;don;front opening garment;maximum assist (25% patient  effort) RUE sling  -AR      Assistive Devices (Upper Body Dressing)  one hand technique  -AR      Upper Body Dressing Position  edge of bed sitting  -AR      Comment (Upper Body Dressing)  Reviewed right shoulder preacutions, ADL retraining to maintain, sling management. Max assist and cues to don sling.   -AR      Recorded by [AR] Julia Bose OT 04/01/19 1733      Row Name 04/01/19 1143             Lower Body Dressing Assessment/Training    Lower Body Dressing [place] Level  don;shoes/slippers;undergarment;pants/bottoms;maximum assist (25% patient effort)  -AR      Lower Body Dressing Position  edge of bed sitting;unsupported standing  -AR      Recorded by [AR] Julia Bose OT 04/01/19 1733      Row Name 04/01/19 1143             Grooming Assessment/Training    [place] Level (Grooming)  hair care, combing/brushing;wash face, hands;supervision;set up  -AR      Grooming Position  edge of bed sitting  -AR      Recorded by [AR] Julia Bose OT 04/01/19 1733      Row Name 04/01/19 1143             Self-Feeding Assessment/Training    [place] Level (Feeding)  feeding skills;supervision;set up  -AR      Position (Self-Feeding)  supported sitting  -AR      Recorded by [AR] Julia Bose OT 04/01/19 1733      Row Name 04/01/19 1143             Toileting Assessment/Training    [place] Level (Toileting)  adjust/manage clothing;change pad/brief;maximum assist (25% patient effort);perform perineal hygiene;contact guard assist  -AR      Assistive Devices (Toileting)  raised toilet seat;grab bar/safety frame  -AR      Toileting Position  unsupported sitting;unsupported standing  -AR      Recorded by [AR] Julia Bose OT 04/01/19 1733      Row Name 04/01/19 1143             BADL Safety/Performance    Impairments, BADL Safety/Performance  balance;pain;range of motion;strength  -AR      Skilled BADL Treatment/Intervention  BADL process/adaptation training;compensatory  training;segundo/one-hand technique  -AR      Progress in BADL Status  improvement noted  -AR      Recorded by [AR] Julia Bose OT 04/01/19 1733      Row Name 04/01/19 1143             Therapeutic Exercise    83944 - OT Therapeutic Exercise Minutes  9  -AR      Recorded by [AR] Julia Bose OT 04/01/19 1733      Row Name 04/01/19 1143             Therapeutic Exercise    Upper Extremity Range of Motion (Therapeutic Exercise)  elbow flexion/extension, right;forearm supination/pronation, right;wrist flexion/extension, right  -AR      Hand (Therapeutic Exercise)  finger flexion/extension, right  -AR      Exercise Type (Therapeutic Exercise)  AROM (active range of motion);AAROM (active assistive range of motion) initial AAROM with elbow, progressed to AROM  -AR      Position (Therapeutic Exercise)  seated  -AR      Sets/Reps (Therapeutic Exercise)  1/10  -AR      Comment (Therapeutic Exercise)  Reviewed RUE HEP, min cues to recall  -AR      Recorded by [AR] Julia Bose OT 04/01/19 1733      Row Name 04/01/19 1143             Balance    Balance  static sitting balance;static standing balance  -AR      Recorded by [AR] Julia Bose OT 04/01/19 1733      Row Name 04/01/19 1143             Static Sitting Balance    Level of [place] (Unsupported Sitting, Static Balance)  independent  -AR      Sitting Position (Unsupported Sitting, Static Balance)  sitting on edge of bed  -AR      Time Able to Maintain Position (Unsupported Sitting, Static Balance)  more than 5 minutes  -AR      Recorded by [AR] Julia Bose OT 04/01/19 1733      Row Name 04/01/19 1143             Static Standing Balance    Level of [place] (Supported Standing, Static Balance)  supervision  -AR      Time Able to Maintain Position (Supported Standing, Static Balance)  1 to 2 minutes  -AR      Recorded by [AR] Julia Bose OT 04/01/19 1733      Row Name 04/01/19 1143 04/01/19 0847          Positioning and  Restraints    Pre-Treatment Position  in bed  -AR  sitting in chair/recliner  -LM     Post Treatment Position  chair  -AR  chair  -LM     In Chair  sitting;call light within reach;encouraged to call for assist;with brace  -AR  notified nsg;sitting;call light within reach;encouraged to call for assist;exit alarm on  -LM     Recorded by [AR] Julia Bose, OT 04/01/19 1733 [LM] Lucretia Suazo, PT 04/01/19 1027     Row Name 04/01/19 1143 04/01/19 0847          Pain Scale: Numbers Pre/Post-Treatment    Pain Scale: Numbers, Pretreatment  1/10  -AR  0/10 - no pain  -LM     Pain Scale: Numbers, Post-Treatment  1/10  -AR  0/10 - no pain  -LM     Pain Location - Side  Right  -AR  --     Pain Location  -- bicep  -AR  --     Recorded by [AR] Julia Bose, OT 04/01/19 1733 [LM] Lucretia Suazo, PT 04/01/19 1027     Row Name 04/01/19 1143             Orthotic/Prosthetic Management    Orthosis Location  upper extremity orthosis  -AR      Recorded by [AR] Julia Bose, OT 04/01/19 1733      Row Name 04/01/19 1143             Upper Extremity Orthosis Management    Type (Upper Extremity Orthosis)  [place] Ultra II sling  -AR      Functional Design (Upper Extremity Orthosis)  static orthosis  -AR      Therapeutic Indications (Upper Extremity Orthosis)  post-op positioning/protection;stabilization and support  -AR      Wearing Schedule (Upper Extremity Orthosis)  remove for exercise;remove for hygiene/bathing  -AR      Orthosis Training (Upper Extremity Orthosis)  patient;activity limitations/precautions;donning/doffing orthosis;orthosis adjustment;orthosis maintenance;purpose/goals of orthosis;sensory precautions;skin inspection/precautions;sleeping precautions;wearing schedule;requires cues;requires assistance;cleaning/care of orthosis  -AR      Compliance/Wearing Issues (Upper Extremity Orthosis)  patient/caregiver comprehend strategies  -AR      Recorded by [AR] Julia Bose, OT 04/01/19 1733      Row  Name                Wound 03/28/19 1317 Right shoulder incision    Wound - Properties Group Date first assessed: 03/28/19 [MR] Time first assessed: 1317 [MR] Side: Right [MR] Location: shoulder [MR] Type: incision [MR] Recorded by:  [MR] Angeles Keating RN 03/28/19 1317    Row Name 04/01/19 1143 04/01/19 0847          Plan of Care Review    Plan of Care Reviewed With  patient  -AR  patient  -LM     Recorded by [AR] Julia Bose, OT 04/01/19 1733 [LM] Lucretia Suazo, PT 04/01/19 1027     Row Name 04/01/19 1143             Outcome Summary/Treatment Plan (OT)    Daily Summary of Progress (OT)  progress toward functional goals as expected;prepare for discharge  -AR      Anticipated Discharge Disposition (OT)  skilled nursing facility  -AR      Reason for Discharge (OT Discharge Summary)  patient discharged from this facility  -AR      Recorded by [AR] Julia Bose, OT 04/01/19 1733      Row Name 04/01/19 0810             Outcome Summary/Treatment Plan (PT)    Daily Summary of Progress (PT)  progress toward functional goals is good  -LM      Recorded by [LM] Lucretia Suazo, PT 04/01/19 1027        User Key  (r) = Recorded By, (t) = Taken By, (c) = Cosigned By    Initials Name Effective Dates Discipline    AR Julia Bose, OT 06/22/15 -  OT    LM Lucretia Suazo, PT 04/03/18 -  PT    MR Ross, Angeles, RN 09/07/18 -  Nurse        Wound 03/28/19 1317 Right shoulder incision (Active)   Dressing Appearance dry;intact;no drainage 3/31/2019  7:40 PM   Closure CECY 3/31/2019  7:40 PM   Base dressing in place, unable to visualize 3/31/2019  7:40 PM   Drainage Amount none 3/31/2019  7:40 PM   Dressing Care, Wound other (see comments) 3/31/2019  7:40 PM       Rehab Goal Summary     Row Name 04/01/19 1143             Bed Mobility Goal 1 (OT)    Progress/Outcomes (Bed Mobility Goal 1, OT)  goal partially met  -AR         Transfer Goal 1 (OT)    Progress/Outcome (Transfer Goal 1, OT)  goal met  -AR         Dressing  Goal 1 (OT)    Progress/Outcome (Dressing Goal 1, OT)  goal not met  -AR         Precaution Goal 1 (OT)    Progress/Outcome (Precaution Goal 1, OT)  goal not met  -AR         Problem Specific Goal 1 (OT)    Progress/Outcome (Problem Specific Goal 1, OT)  goal not met  -AR        User Key  (r) = Recorded By, (t) = Taken By, (c) = Cosigned By    Initials Name Provider Type Discipline    Julia Lenz, OT Occupational Therapist OT          Occupational Therapy Education     Title: PT OT SLP Therapies (Done)     Topic: Occupational Therapy (Done)     Point: ADL training (Done)     Description: Instruct learner(s) on proper safety adaptation and remediation techniques during self care or transfers.   Instruct in proper use of assistive devices.    Learning Progress Summary           Patient Eager, E,TB,D, VU,NR by AR at 4/1/2019 11:43 AM    Eager, E,TB,D, VU,NR by AR at 3/31/2019 10:49 AM    Acceptance, E,D,TB, VU,NR by AR at 3/30/2019  2:48 PM    Acceptance, E, VU,NR by HK at 3/29/2019  1:21 PM    Acceptance, E, VU,NR by HK at 3/29/2019  1:21 PM    Comment:  Pt educated on sling management, shoulder precautions, axilla care, segundo dressing, and care of nerve catheter during ADLS. Educated on R UE HEP, appropriate body mechanics and safety precautions.                   Point: Home exercise program (Done)     Description: Instruct learner(s) on appropriate technique for monitoring, assisting and/or progressing therapeutic exercises/activities.    Learning Progress Summary           Patient Eager, E,TB,D, VU,NR by AR at 4/1/2019 11:43 AM    Eager, E,TB,D, VU,NR by AR at 3/31/2019 10:49 AM    Acceptance, E,D,TB, VU,NR by AR at 3/30/2019  2:48 PM    Acceptance, E, VU,NR by HK at 3/29/2019  1:21 PM    Acceptance, E, VU,NR by HK at 3/29/2019  1:21 PM    Comment:  Pt educated on sling management, shoulder precautions, axilla care, segundo dressing, and care of nerve catheter during ADLS. Educated on R UE HEP, appropriate  body mechanics and safety precautions.                   Point: Precautions (Done)     Description: Instruct learner(s) on prescribed precautions during self-care and functional transfers.    Learning Progress Summary           Patient Eager, E,TB,D, VU,NR by AR at 4/1/2019 11:43 AM    Eager, E,TB,D, VU,NR by AR at 3/31/2019 10:49 AM    Acceptance, E,D,TB, VU,NR by AR at 3/30/2019  2:48 PM    Acceptance, E, VU,NR by HK at 3/29/2019  1:21 PM    Acceptance, E, VU,NR by HK at 3/29/2019  1:21 PM    Comment:  Pt educated on sling management, shoulder precautions, axilla care, segundo dressing, and care of nerve catheter during ADLS. Educated on R UE HEP, appropriate body mechanics and safety precautions.                   Point: Body mechanics (Done)     Description: Instruct learner(s) on proper positioning and spine alignment during self-care, functional mobility activities and/or exercises.    Learning Progress Summary           Patient Eager, E,TB,D, VU,NR by AR at 4/1/2019 11:43 AM    Eager, E,TB,D, VU,NR by AR at 3/31/2019 10:49 AM    Acceptance, E,D,TB, VU,NR by AR at 3/30/2019  2:48 PM    Acceptance, E, VU,NR by HK at 3/29/2019  1:21 PM    Acceptance, E, VU,NR by HK at 3/29/2019  1:21 PM    Comment:  Pt educated on sling management, shoulder precautions, axilla care, segundo dressing, and care of nerve catheter during ADLS. Educated on R UE HEP, appropriate body mechanics and safety precautions.                               User Key     Initials Effective Dates Name Provider Type Discipline    AR 06/22/15 -  Julia Bose, OT Occupational Therapist OT     03/07/18 -  Aleah Moore OT Occupational Therapist OT                OT Recommendation and Plan  Outcome Summary/Treatment Plan (OT)  Daily Summary of Progress (OT): progress toward functional goals as expected, prepare for discharge  Anticipated Discharge Disposition (OT): skilled nursing facility  Reason for Discharge (OT Discharge Summary): patient  discharged from this facility  Daily Summary of Progress (OT): progress toward functional goals as expected, prepare for discharge  Plan of Care Review  Plan of Care Reviewed With: patient  Plan of Care Reviewed With: patient  Outcome Summary: Interscalene has been DC, post pain 1/10 right biceps. Pt completed bed mobility with min assist, transfers with supervision, UB ADLS with max assist. Recommend SNF-level rehab.     Outcome Measures     Row Name 04/01/19 1143 04/01/19 0847 03/31/19 1049       How much help from another person do you currently need...    Turning from your back to your side while in flat bed without using bedrails?  --  3  -LM  --    Moving from lying on back to sitting on the side of a flat bed without bedrails?  --  3  -LM  --    Moving to and from a bed to a chair (including a wheelchair)?  --  3  -LM  --    Standing up from a chair using your arms (e.g., wheelchair, bedside chair)?  --  3  -LM  --    Climbing 3-5 steps with a railing?  --  3  -LM  --    To walk in hospital room?  --  3  -LM  --    AM-PAC 6 Clicks Score  --  18  -LM  --       How much help from another is currently needed...    Putting on and taking off regular lower body clothing?  2  -AR  --  2  -AR    Bathing (including washing, rinsing, and drying)  2  -AR  --  2  -AR    Toileting (which includes using toilet bed pan or urinal)  3  -AR  --  3  -AR    Putting on and taking off regular upper body clothing  2  -AR  --  2  -AR    Taking care of personal grooming (such as brushing teeth)  3  -AR  --  3  -AR    Eating meals  3  -AR  --  3  -AR    Score  15  -AR  --  15  -AR       Functional Assessment    Outcome Measure Options  AM-PAC 6 Clicks Daily Activity (OT)  -AR  AM-PAC 6 Clicks Basic Mobility (PT)  -LM  --    Row Name 03/31/19 0730 03/30/19 1448 03/30/19 1415       How much help from another person do you currently need...    Turning from your back to your side while in flat bed without using bedrails?  3  -MJ  --  3   -MJ    Moving from lying on back to sitting on the side of a flat bed without bedrails?  3  -MJ  --  3  -MJ    Moving to and from a bed to a chair (including a wheelchair)?  3  -MJ  --  3  -MJ    Standing up from a chair using your arms (e.g., wheelchair, bedside chair)?  3  -MJ  --  3  -MJ    Climbing 3-5 steps with a railing?  3  -MJ  --  3  -MJ    To walk in hospital room?  3  -MJ  --  3  -MJ    AM-PAC 6 Clicks Score  18  -MJ  --  18  -MJ       How much help from another is currently needed...    Putting on and taking off regular lower body clothing?  --  2  -AR  --    Bathing (including washing, rinsing, and drying)  --  2  -AR  --    Toileting (which includes using toilet bed pan or urinal)  --  2  -AR  --    Putting on and taking off regular upper body clothing  --  2  -AR  --    Taking care of personal grooming (such as brushing teeth)  --  3  -AR  --    Eating meals  --  3  -AR  --    Score  --  14  -AR  --       Functional Assessment    Outcome Measure Options  AM-PAC 6 Clicks Basic Mobility (PT)  -  AM-PAC 6 Clicks Daily Activity (OT)  -AR  AM-[facility] 6 Clicks Basic Mobility (PT)  -      User Key  (r) = Recorded By, (t) = Taken By, (c) = Cosigned By    Initials Name Provider Type    Julia Lenz, OT Occupational Therapist    Ever Avelar, PT Physical Therapist    Lucretia Cramer, PT Physical Therapist           Time Calculation:    Time Calculation- OT     Row Name 04/01/19 1145 04/01/19 1143 04/01/19 0847       Time Calculation- OT    OT Start Time  1145  -AR  --  --    Total Timed Code Minutes- OT  42 minute(s)  -AR  --  --    OT Received On  04/01/19  -AR  --  --    OT Goal Re-Cert Due Date  04/08/19  -AR  --  --       Timed Charges    50142 - OT Therapeutic Exercise Minutes  --  9  -AR  --    28657 - Gait Training Minutes   --  --  9  -LM    91187 - OT Self Care/Mgmt Minutes  --  33  -AR  --      User Key  (r) = Recorded By, (t) = Taken By, (c) = Cosigned By    Initials Name Provider  Type    AR Julia Bose OT Occupational Therapist    Lucretia Cramer, PT Physical Therapist        Therapy Suggested Charges     Code   Minutes Charges    07090 (CPT®) Hc Ot Neuromusc Re Education Ea 15 Min      93557 (CPT®) Hc Ot Ther Proc Ea 15 Min 9 1    83403 (CPT®) Hc Ot Therapeutic Act Ea 15 Min      90852 (CPT®) Hc Ot Manual Therapy Ea 15 Min      89697 (CPT®) Hc Ot Iontophoresis Ea 15 Min      24316 (CPT®) Hc Ot Elec Stim Ea-Per 15 Min      95786 (CPT®) Hc Ot Ultrasound Ea 15 Min      57606 (CPT®) Hc Ot Self Care/Mgmt/Train Ea 15 Min 33 2    Total  42 3        Therapy Charges for Today     Code Description Service Date Service Provider Modifiers Qty    60658339849 HC OT THER PROC EA 15 MIN 3/31/2019 Julia Bose OT GO 1    22576189272 HC OT SELF CARE/MGMT/TRAIN EA 15 MIN 3/31/2019 Julia Bose OT GO 2    37821292442 HC OT THER PROC EA 15 MIN 4/1/2019 Julia Bose OT GO 1    43278680125 HC OT SELF CARE/MGMT/TRAIN EA 15 MIN 4/1/2019 Julia Bose OT GO 2               OT Discharge Summary  Anticipated Discharge Disposition (OT): skilled nursing facility    Julia Bose OT  4/1/2019

## 2019-04-02 ENCOUNTER — READMISSION MANAGEMENT (OUTPATIENT)
Dept: CALL CENTER | Facility: HOSPITAL | Age: 74
End: 2019-04-02

## 2019-04-02 NOTE — OUTREACH NOTE
Prep Survey      Responses   Facility patient discharged from?  [place]   Is patient eligible?  No   What are the reasons patient is not eligible?  [facility]   Does the patient have one of the following disease processes/diagnoses(primary or secondary)?  Other   Prep survey completed?  Yes          Pamela Jung RN

## (undated) DEVICE — T4 PULLOVER TOGA, LARGE

## (undated) DEVICE — DRSNG SURG AQUACEL AG 9X25CM

## (undated) DEVICE — GLV SURG SENSICARE MICRO PF LF 7.5 STRL

## (undated) DEVICE — INTENDED FOR TISSUE SEPARATION, AND OTHER PROCEDURES THAT REQUIRE A SHARP SURGICAL BLADE TO PUNCTURE OR CUT.: Brand: BARD-PARKER ® CARBON RIB-BACK BLADES

## (undated) DEVICE — PUMP PAIN AUTOFUSER AUTO SELCT NOBOLUS 1TO14ML/HR 550ML DISP

## (undated) DEVICE — 450 ML BOTTLE OF 0.05% CHLORHEXIDINE GLUCONATE IN 99.95% STERILE WATER FOR IRRIGATION, USP AND APPLICATOR.: Brand: IRRISEPT ANTIMICROBIAL WOUND LAVAGE

## (undated) DEVICE — ENCORE® LATEX MICRO SIZE 6.5, STERILE LATEX POWDER-FREE SURGICAL GLOVE: Brand: ENCORE

## (undated) DEVICE — GLV SURG TRIUMPH ORTHO W/ALOE PF LTX 7.5 STRL

## (undated) DEVICE — 1010 S-DRAPE TOWEL DRAPE 10/BX: Brand: STERI-DRAPE™

## (undated) DEVICE — 3M™ IOBAN™ 2 ANTIMICROBIAL INCISE DRAPE 6651EZ: Brand: IOBAN™ 2

## (undated) DEVICE — SUT VIC 2/0 CT2 27IN J269H

## (undated) DEVICE — PK MAJ SHLDR SPLT 10

## (undated) DEVICE — UNDERGLV SURG BIOGEL INDICAT PF 61/2 GRN

## (undated) DEVICE — SUT ETHLN 3/0 PC5 18IN 1893G

## (undated) DEVICE — BIT DRL UNIVERS REVERS MODULAR 3MM

## (undated) DEVICE — DRAPE,REIN 53X77,STERILE: Brand: MEDLINE

## (undated) DEVICE — SPNG GZ WOVN 4X4IN 12PLY 10/BX STRL

## (undated) DEVICE — STERILE PVP: Brand: MEDLINE INDUSTRIES, INC.

## (undated) DEVICE — SLNG ULTRSLING3 13TO15IN LG

## (undated) DEVICE — UNDERGLV SURG BIOGEL INDICAT PI SZ8 BLU

## (undated) DEVICE — 3M™ STERI-DRAPE™ U-DRAPE 1015: Brand: STERI-DRAPE™

## (undated) DEVICE — ANTIBACTERIAL UNDYED BRAIDED (POLYGLACTIN 910), SYNTHETIC ABSORBABLE SUTURE: Brand: COATED VICRYL

## (undated) DEVICE — T-MAX DISPOSABLE FACE MASK 8 PER BOX

## (undated) DEVICE — GUIDEPIN UNIVERS REVERS MODULAR 2.8MM

## (undated) DEVICE — ST PIN FIX TEMP UNIVERS REVERS W/OSTEO/GUIDE/PIN 2.4MM STRL

## (undated) DEVICE — SOL ANTISEP SCRB CHG 4PCT 4OZ